# Patient Record
Sex: FEMALE | Race: WHITE | NOT HISPANIC OR LATINO | Employment: UNEMPLOYED | URBAN - METROPOLITAN AREA
[De-identification: names, ages, dates, MRNs, and addresses within clinical notes are randomized per-mention and may not be internally consistent; named-entity substitution may affect disease eponyms.]

---

## 2017-01-26 DIAGNOSIS — G40.909 SEIZURE DISORDER (H): Primary | ICD-10-CM

## 2017-01-26 RX ORDER — CARBAMAZEPINE 300 MG/1
CAPSULE, EXTENDED RELEASE ORAL
Qty: 90 CAPSULE | Refills: 0 | Status: SHIPPED | OUTPATIENT
Start: 2017-01-26 | End: 2017-02-09

## 2017-02-09 ENCOUNTER — OFFICE VISIT (OUTPATIENT)
Dept: NEUROLOGY | Facility: CLINIC | Age: 22
End: 2017-02-09

## 2017-02-09 VITALS
SYSTOLIC BLOOD PRESSURE: 130 MMHG | WEIGHT: 257.6 LBS | DIASTOLIC BLOOD PRESSURE: 74 MMHG | HEART RATE: 72 BPM | BODY MASS INDEX: 39.18 KG/M2

## 2017-02-09 DIAGNOSIS — G40.909 SEIZURE DISORDER (H): Primary | ICD-10-CM

## 2017-02-09 DIAGNOSIS — G25.81 RESTLESS LEG SYNDROME: ICD-10-CM

## 2017-02-09 RX ORDER — CARBAMAZEPINE 300 MG/1
CAPSULE, EXTENDED RELEASE ORAL
Qty: 90 CAPSULE | Refills: 11 | Status: SHIPPED | OUTPATIENT
Start: 2017-02-09 | End: 2018-02-14

## 2017-02-09 RX ORDER — ROPINIROLE 0.5 MG/1
0.5 TABLET, FILM COATED ORAL AT BEDTIME
Qty: 30 TABLET | Refills: 11 | Status: SHIPPED | OUTPATIENT
Start: 2017-02-09 | End: 2018-03-11

## 2017-02-09 RX ORDER — FOLIC ACID 1 MG/1
2 TABLET ORAL DAILY
Qty: 60 TABLET | Refills: 11 | Status: SHIPPED | OUTPATIENT
Start: 2017-02-09 | End: 2018-02-19

## 2017-02-09 NOTE — LETTER
Date:February 28, 2017      Patient was self referred, no letter generated. Do not send.        [facility] Health Information

## 2017-02-09 NOTE — MR AVS SNAPSHOT
After Visit Summary   2017    Tita Prieto    MRN: [id number]           Patient Information     Date Of Birth          1995        Visit Information        Provider Department      2017 10:30 AM Maris [facility]        Today's Diagnoses     Seizure disorder (H)    -  1     Restless leg syndrome            Follow-ups after your visit        Follow-up notes from your care team     Return in about 1 year (around 2018).      Who to contact     Please call your clinic at [phone] to:    Ask questions about your health    Make or cancel appointments    Discuss your medicines    Learn about your test results    Speak to your doctor   If you have compliments or concerns about an experience at your clinic, or if you wish to file a complaint, please contact [facility] Patient Relations at [phone] or email us at [email or web address]         Additional Information About Your Visit        MyChart Information     [employer] is an electronic gateway that provides easy, online access to your medical records. With [employer], you can request a clinic appointment, read your test results, renew a prescription or communicate with your care team.     To sign up for [employer] visit the website at [email or web address]   You will be asked to enter the access code listed below, as well as some personal information. Please follow the directions to create your username and password.     Your access code is: [email or web address]  Expires: 5/10/2017  3:29 PM     Your access code will  in 90 days. If you need help or a new code, please contact your [facility] or call [phone] for assistance.        Care EveryWhere ID     This is your Care EveryWhere ID. This could be used by other organizations to access your [facility] medical records  [id number]        Your Vitals Were     Pulse Breastfeeding?                72 No            Blood Pressure from Last 3 Encounters:   02/09/17 130/74   01/04/16 140/80   11/18/15 119/69    Weight from Last 3 Encounters:   02/09/17 257 lb 9.6 oz (116.847 kg)   01/04/16 239 lb 6.4 oz (108.591 kg)   11/17/15 235 lb 6.4 oz (106.777 kg)              We Performed the Following     Carbamazepine and epoxide free and total          Today's Medication Changes          These changes are accurate as of: 2/9/17  3:29 PM.  If you have any questions, ask your nurse or doctor.               Start taking these medicines.        Dose/Directions    folic acid 1 MG tablet   Commonly known as:  FOLVITE   Used for:  Seizure disorder (H)   Started by:  Maris Otero MD        Dose:  2 mg   Take 2 tablets (2 mg) by mouth daily   Quantity:  60 tablet   Refills:  11         These medicines have changed or have updated prescriptions.        Dose/Directions    carBAMazepine 300 MG 12 hr capsule   Commonly known as:  CARBATROL   This may have changed:  See the new instructions.   Used for:  Seizure disorder (H)   Changed by:  Maris Otero MD        TAKE ONE CAPSULE BY MOUTH DAILY IN THE MORNING AND TWO CAPSULES DAILY IN THE EVENING   Quantity:  90 capsule   Refills:  11       rOPINIRole 0.5 MG tablet   Commonly known as:  REQUIP   This may have changed:    - medication strength  - how much to take   Used for:  Restless leg syndrome   Changed by:  Maris Otero MD        Dose:  0.5 mg   Take 1 tablet (0.5 mg) by mouth At Bedtime   Quantity:  30 tablet   Refills:  11            Where to get your medicines      These medications were sent to [facility] - [place], [place] - [address]   [address] [facility] [address]     Phone:  [phone]    - carBAMazepine 300 MG 12 hr capsule  - folic acid 1 MG tablet  - rOPINIRole 0.5 MG tablet             Primary Care Provider    None Specified       No primary provider on file.        Thank you!     Thank you for choosing [facility]  for your care. Our goal is  always to provide you with excellent care. Hearing back from our patients is one way we can continue to improve our services. Please take a few minutes to complete the written survey that you may receive in the mail after your visit with us. Thank you!             Your Updated Medication List - Protect others around you: Learn how to safely use, store and throw away your medicines at www.disposemymeds.org.          This list is accurate as of: 2/9/17  3:29 PM.  Always use your most recent med list.                   Brand Name Dispense Instructions for use    carBAMazepine 300 MG 12 hr capsule    CARBATROL    90 capsule    TAKE ONE CAPSULE BY MOUTH DAILY IN THE MORNING AND TWO CAPSULES DAILY IN THE EVENING       folic acid 1 MG tablet    FOLVITE    60 tablet    Take 2 tablets (2 mg) by mouth daily       rOPINIRole 0.5 MG tablet    REQUIP    30 tablet    Take 1 tablet (0.5 mg) by mouth At Bedtime

## 2017-02-09 NOTE — LETTER
"2017       RE: Tita Prieto  : 1995   MRN: [id number]      Dear Colleague,    Thank you for referring your patient, Tita Prieto, to the [facility] at [facility]. Please see a copy of my visit note below.    CC: Follow up for seizures.  HPI: Ms. Tita Evans came back to Epilepsy Clinic today for follow up. She is by herself in the clinic today. She is a 21-year-old, right-handed female with a history of seizures since .  She had seen Dr. Villa from [facility] in the past. Since the last visit,  she had no seizures.  Her last seizure was in .2015, she had two seizures in Oct 2015 on the same day.  They were right leg jerking movement that lasted for 1-2 min each, no loss of consciousness or amnesia.  She denied missing any medications.  She was sleep deprived and overworked on that days.  She is currently taking Carbamazepine 300 mg-600 mg.  No side effect reported.  Last carbamazepine level was 11.5.  She is compliant with her medications.   She was also diagnosed with \"restless leg syndrome\".  She is currently on Requip 05 mg qhs, and it is working very well to control her symptoms.  She also mentioned that exercise helped a lot for her RLS.  If she does not exercise, she will have a lot of problems with RLS.   Her seizures started in . Her typical seizures are described as she has an aura of right arm and leg tingling achy sensation, then followed by right arm and leg shaking arrhythmically which lasts for about 30 seconds to 1 minute. She was started on carbamazepine many years ago, and her seizures were well controlled until 2009 when she was trying to wean herself off the carbamazepine, then she started seizure recurrence. She had a series of a generalized tonic-clonic seizures at that time, so she was put back on carbamazepine. Her seizures usually occur in the early morning. In 2011, when she was seeing Dr. Villa, he " was trying to wean the patient off the carbamazepine again, and in February, after she was off the medication for a week, then she had another simple partial seizure with the right arm and the leg jerking for about 1 minute,   RISK FACTORS FOR SEIZURES: She had no history of head trauma with loss of consciousness. No history of CNS infections. No history of febrile convulsions. TRIGGERS FOR SEIZURES Unknown.   PAST MEDICAL HISTORY: None.   PAST SURGICAL HISTORY: None.   FAMILY HISTORY: No family history of seizures. Grandmother had a history of diabetes. Aunt had a history of brain hemorrhage. Mother and sister have RLS.    Current Outpatient Prescriptions   Medication Sig Dispense Refill     carBAMazepine (CARBATROL) 300 MG 12 hr capsule TAKE ONE CAPSULE BY MOUTH DAILY IN THE MORNING AND TWO CAPSULES DAILY IN THE EVENING 90 capsule 0     ROPINIRole HCl (REQUIP PO) Take by mouth At Bedtime         ALLERGIES: No known drug allergies.   SOCIAL HISTORY: She is single, living with her mother. No smoking, no alcohol, no drug abuse.  She is in college, major in social work.  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  REVIEW OF SYSTEMS: The 10-point review of systems are completely negative except for seizures.   PREVIOUS DIAGNOSTIC TESTING: She had an MRI and EEG studies over 10 years ago. Results are not available at this time.   PHYSICAL EXAMINATION:  Blood pressure 130/74, pulse 72, weight 257 lb 9.6 oz (116.847 kg), not currently breastfeeding.    General exam: General Appearance: No acute distress. HEENT: Normocephalic, atraumatic. Neck: Supple. Extremities: No edema, no clubbing, no cyanosis.     Neurologic Exam: Alert and oriented x3. Speech fluent, appropriate. Normal attention. Cranial Nerves: Pupils are equal, round, reactive to light and accomodation. Extraocular movement intact. No facial weakness or asymmetry. Hearing normal. Motor Exam: Normal. Coordination:no ataxia. Gait and  Station: normal.    IMPRESSION:   1. Epilepsy.  She is a 21-year-old, right-handed female with a history of seizures since 2000.  She had seen Dr. Villa from [facility] in the past. Since the last visit,  she had no seizures.  Her last seizure was in Oc.t 2015, she had two seizures in Oct 2015 on the same day.  They were right leg jerking movement that lasted for 1-2 min each, no loss of consciousness or amnesia.  She denied missing any medications.  She was sleep deprived and overworked on that days.  She is currently taking Carbamazepine 300 mg-600 mg.  No side effect reported.  Last carbamazepine level was 11.5.  She is compliant with her medications.   Patient was consulted regarding pregnancy and the possible fetal malformation with carbamazepine.  She voice the understanding the the increased risk of fetal malformation with AEDs.  She is planning to get pregnant in about one year.  2. Restless leg syndrome. Improved. Continue on Requip.  She is currently on Requip 05 mg qhs, and it is working very well to control her symptoms.  She also mentioned that exercise helped a lot for her RLS.  If she does not exercise, she will have a lot of problems with RLS.    PLAN:   1. Continue carbamazepine 300 mg in a.m. 600 mg p.m.    2. Check Carbamazepine level.  3. Continue Requip 0.5 mg qhs.  4. Folic acid 2 mg qd.  5. RTC in 12 months.    25 min was spent on the visit.  Over 50% of the time was spent on education, counseling about optimal seizure control and coordination of care.      Again, thank you for allowing me to participate in the care of your patient.      Sincerely,    Maris Otero MD

## 2017-02-09 NOTE — PROGRESS NOTES
"CC: Follow up for seizures.  HPI: Ms. Tita Evans came back to Epilepsy Clinic today for follow up. She is by herself in the clinic today. She is a 21-year-old, right-handed female with a history of seizures since 2000.  She had seen Dr. Villa from [facility] in the past. Since the last visit,  she had no seizures.  Her last seizure was in Oc.t 2015, she had two seizures in Oct 2015 on the same day.  They were right leg jerking movement that lasted for 1-2 min each, no loss of consciousness or amnesia.  She denied missing any medications.  She was sleep deprived and overworked on that days.  She is currently taking Carbamazepine 300 mg-600 mg.  No side effect reported.  Last carbamazepine level was 11.5.  She is compliant with her medications.   She was also diagnosed with \"restless leg syndrome\".  She is currently on Requip 05 mg qhs, and it is working very well to control her symptoms.  She also mentioned that exercise helped a lot for her RLS.  If she does not exercise, she will have a lot of problems with RLS.   Her seizures started in 2000. Her typical seizures are described as she has an aura of right arm and leg tingling achy sensation, then followed by right arm and leg shaking arrhythmically which lasts for about 30 seconds to 1 minute. She was started on carbamazepine many years ago, and her seizures were well controlled until 05/2009 when she was trying to wean herself off the carbamazepine, then she started seizure recurrence. She had a series of a generalized tonic-clonic seizures at that time, so she was put back on carbamazepine. Her seizures usually occur in the early morning. In 12/2011, when she was seeing Dr. Villa, he was trying to wean the patient off the carbamazepine again, and in February, after she was off the medication for a week, then she had another simple partial seizure with the right arm and the leg jerking for about 1 minute,   RISK FACTORS FOR SEIZURES: She had no " history of head trauma with loss of consciousness. No history of CNS infections. No history of febrile convulsions. TRIGGERS FOR SEIZURES Unknown.   PAST MEDICAL HISTORY: None.   PAST SURGICAL HISTORY: None.   FAMILY HISTORY: No family history of seizures. Grandmother had a history of diabetes. Aunt had a history of brain hemorrhage. Mother and sister have RLS.    Current Outpatient Prescriptions   Medication Sig Dispense Refill     carBAMazepine (CARBATROL) 300 MG 12 hr capsule TAKE ONE CAPSULE BY MOUTH DAILY IN THE MORNING AND TWO CAPSULES DAILY IN THE EVENING 90 capsule 0     ROPINIRole HCl (REQUIP PO) Take by mouth At Bedtime         ALLERGIES: No known drug allergies.   SOCIAL HISTORY: She is single, living with her mother. No smoking, no alcohol, no drug abuse.  She is in college, major in social work.  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  REVIEW OF SYSTEMS: The 10-point review of systems are completely negative except for seizures.   PREVIOUS DIAGNOSTIC TESTING: She had an MRI and EEG studies over 10 years ago. Results are not available at this time.   PHYSICAL EXAMINATION:  Blood pressure 130/74, pulse 72, weight 257 lb 9.6 oz (116.847 kg), not currently breastfeeding.    General exam: General Appearance: No acute distress. HEENT: Normocephalic, atraumatic. Neck: Supple. Extremities: No edema, no clubbing, no cyanosis.     Neurologic Exam: Alert and oriented x3. Speech fluent, appropriate. Normal attention. Cranial Nerves: Pupils are equal, round, reactive to light and accomodation. Extraocular movement intact. No facial weakness or asymmetry. Hearing normal. Motor Exam: Normal. Coordination:no ataxia. Gait and Station: normal.    IMPRESSION:   1. Epilepsy.  She is a 21-year-old, right-handed female with a history of seizures since 2000.  She had seen Dr. Villa from [facility] in the past. Since the last visit,  she had no seizures.  Her last seizure was in Oc.t  2015, she had two seizures in Oct 2015 on the same day.  They were right leg jerking movement that lasted for 1-2 min each, no loss of consciousness or amnesia.  She denied missing any medications.  She was sleep deprived and overworked on that days.  She is currently taking Carbamazepine 300 mg-600 mg.  No side effect reported.  Last carbamazepine level was 11.5.  She is compliant with her medications.   Patient was consulted regarding pregnancy and the possible fetal malformation with carbamazepine.  She voice the understanding the the increased risk of fetal malformation with AEDs.  She is planning to get pregnant in about one year.  2. Restless leg syndrome. Improved. Continue on Requip.  She is currently on Requip 05 mg qhs, and it is working very well to control her symptoms.  She also mentioned that exercise helped a lot for her RLS.  If she does not exercise, she will have a lot of problems with RLS.    PLAN:   1. Continue carbamazepine 300 mg in a.m. 600 mg p.m.    2. Check Carbamazepine level.  3. Continue Requip 0.5 mg qhs.  4. Folic acid 2 mg qd.  5. RTC in 12 months.    25 min was spent on the visit.  Over 50% of the time was spent on education, counseling about optimal seizure control and coordination of care.

## 2017-02-11 LAB
CARBAMAZEPINE EP FREE SERPL-MCNC: 0.6 UG/ML
CARBAMAZEPINE EP SERPL-MCNC: 1.4 UG/ML
CARBAMAZEPINE FREE SERPL-MCNC: 1.9 UG/ML
CARBAMAZEPINE SERPL-MCNC: 8.4 UG/ML

## 2017-02-22 ENCOUNTER — TELEPHONE (OUTPATIENT)
Dept: NEUROLOGY | Facility: CLINIC | Age: 22
End: 2017-02-22

## 2018-01-06 ENCOUNTER — HOSPITAL ENCOUNTER (EMERGENCY)
Facility: CLINIC | Age: 23
Discharge: HOME | End: 2018-01-06
Attending: EMERGENCY MEDICINE | Admitting: EMERGENCY MEDICINE
Payer: COMMERCIAL

## 2018-01-06 VITALS — OXYGEN SATURATION: 96 % | SYSTOLIC BLOOD PRESSURE: 146 MMHG | TEMPERATURE: 98.5 F | DIASTOLIC BLOOD PRESSURE: 93 MMHG

## 2018-01-06 DIAGNOSIS — G40.109 LOCALIZATION-RELATED FOCAL EPILEPSY WITH SIMPLE PARTIAL SEIZURES (H): ICD-10-CM

## 2018-01-06 DIAGNOSIS — G25.81 RESTLESS LEG SYNDROME: ICD-10-CM

## 2018-01-06 LAB
ANION GAP SERPL CALCULATED.3IONS-SCNC: 11 MMOL/L (ref 3–14)
BASOPHILS # BLD AUTO: 0.1 10E9/L (ref 0–0.2)
BASOPHILS NFR BLD AUTO: 0.4 %
BUN SERPL-MCNC: 15 MG/DL (ref 7–30)
CALCIUM SERPL-MCNC: 8.4 MG/DL (ref 8.5–10.1)
CHLORIDE SERPL-SCNC: 102 MMOL/L (ref 94–109)
CO2 SERPL-SCNC: 24 MMOL/L (ref 20–32)
CREAT SERPL-MCNC: 0.45 MG/DL (ref 0.52–1.04)
DIFFERENTIAL METHOD BLD: ABNORMAL
EOSINOPHIL # BLD AUTO: 0.1 10E9/L (ref 0–0.7)
EOSINOPHIL NFR BLD AUTO: 0.7 %
ERYTHROCYTE [DISTWIDTH] IN BLOOD BY AUTOMATED COUNT: 12.6 % (ref 10–15)
GFR SERPL CREATININE-BSD FRML MDRD: >90 ML/MIN/1.7M2
GLUCOSE SERPL-MCNC: 126 MG/DL (ref 70–99)
HCT VFR BLD AUTO: 39.4 % (ref 35–47)
HGB BLD-MCNC: 13.1 G/DL (ref 11.7–15.7)
IMM GRANULOCYTES # BLD: 0 10E9/L (ref 0–0.4)
IMM GRANULOCYTES NFR BLD: 0.1 %
LYMPHOCYTES # BLD AUTO: 3.7 10E9/L (ref 0.8–5.3)
LYMPHOCYTES NFR BLD AUTO: 30.5 %
MCH RBC QN AUTO: 28.9 PG (ref 26.5–33)
MCHC RBC AUTO-ENTMCNC: 33.2 G/DL (ref 31.5–36.5)
MCV RBC AUTO: 87 FL (ref 78–100)
MONOCYTES # BLD AUTO: 0.6 10E9/L (ref 0–1.3)
MONOCYTES NFR BLD AUTO: 5 %
NEUTROPHILS # BLD AUTO: 7.6 10E9/L (ref 1.6–8.3)
NEUTROPHILS NFR BLD AUTO: 63.3 %
PLATELET # BLD AUTO: 357 10E9/L (ref 150–450)
POTASSIUM SERPL-SCNC: 4 MMOL/L (ref 3.4–5.3)
RBC # BLD AUTO: 4.53 10E12/L (ref 3.8–5.2)
SODIUM SERPL-SCNC: 137 MMOL/L (ref 133–144)
WBC # BLD AUTO: 12.1 10E9/L (ref 4–11)

## 2018-01-06 PROCEDURE — 80156 ASSAY CARBAMAZEPINE TOTAL: CPT | Performed by: EMERGENCY MEDICINE

## 2018-01-06 PROCEDURE — 80339 ANTIEPILEPTICS NOS 1-3: CPT | Performed by: EMERGENCY MEDICINE

## 2018-01-06 PROCEDURE — 80048 BASIC METABOLIC PNL TOTAL CA: CPT | Performed by: EMERGENCY MEDICINE

## 2018-01-06 PROCEDURE — 80157 ASSAY CARBAMAZEPINE FREE: CPT | Performed by: EMERGENCY MEDICINE

## 2018-01-06 PROCEDURE — 99285 EMERGENCY DEPT VISIT HI MDM: CPT | Mod: Z6 | Performed by: EMERGENCY MEDICINE

## 2018-01-06 PROCEDURE — 85025 COMPLETE CBC W/AUTO DIFF WBC: CPT | Performed by: EMERGENCY MEDICINE

## 2018-01-06 PROCEDURE — 25000128 H RX IP 250 OP 636: Performed by: EMERGENCY MEDICINE

## 2018-01-06 PROCEDURE — 96374 THER/PROPH/DIAG INJ IV PUSH: CPT | Performed by: EMERGENCY MEDICINE

## 2018-01-06 PROCEDURE — 99285 EMERGENCY DEPT VISIT HI MDM: CPT | Mod: 25 | Performed by: EMERGENCY MEDICINE

## 2018-01-06 RX ORDER — LORAZEPAM 2 MG/ML
1 INJECTION INTRAMUSCULAR ONCE
Status: COMPLETED | OUTPATIENT
Start: 2018-01-06 | End: 2018-01-06

## 2018-01-06 RX ORDER — LORAZEPAM 1 MG/1
1 TABLET ORAL EVERY 8 HOURS PRN
Qty: 15 TABLET | Refills: 0 | Status: SHIPPED | OUTPATIENT
Start: 2018-01-06 | End: 2018-02-19

## 2018-01-06 RX ADMIN — LORAZEPAM 1 MG: 2 INJECTION INTRAMUSCULAR; INTRAVENOUS at 21:27

## 2018-01-06 ASSESSMENT — ENCOUNTER SYMPTOMS
DIARRHEA: 0
HEMATURIA: 0
DYSURIA: 0
DIFFICULTY URINATING: 0
RHINORRHEA: 1
COUGH: 1
CONSTIPATION: 0
FEVER: 0
SEIZURES: 1
FREQUENCY: 0
BLOOD IN STOOL: 0
HEADACHES: 0

## 2018-01-06 NOTE — ED AVS SNAPSHOT
[facility]    [address] [address]    Phone:  [phone]    Fax:  [phone]                                       Tita Prieto   MRN: [id number]    Department:  [facility]   Date of Visit:  1/6/2018           After Visit Summary Signature Page     I have received my discharge instructions, and my questions have been answered. I have discussed any challenges I see with this plan with the nurse or doctor.    ..........................................................................................................................................  Patient/Patient Representative Signature      ..........................................................................................................................................  Patient Representative Print Name and Relationship to Patient    ..................................................               ................................................  Date                                            Time    ..........................................................................................................................................  Reviewed by Signature/Title    ...................................................              ..............................................  Date                                                            Time

## 2018-01-06 NOTE — ED AVS SNAPSHOT
[facility] Emergency Department    [address] [address]    Phone:  [phone]    Fax:  [phone]                                       Tita Prieto   MRN: [id number]    Department:  [facility] Emergency Department   Date of Visit:  1/6/2018           Patient Information     Date Of Birth          1995        Your diagnoses for this visit were:     Localization-related focal epilepsy with simple partial seizures (H)     Restless leg syndrome        You were seen by Oracio Castillo MD.        Discharge Instructions       Follow up with neurology.   Ativan only if needed for further seizures and should contact neurology if this occurs.          Discharge Instructions for Epilepsy  You have been diagnosed with epilepsy, a disorder of recurring seizures. When you have a seizure, an electrical disturbance happens in your brain. There are different kinds of seizures, and each patient may have one or many types of seizures. Here are some guidelines for you and your family.  If you have a seizure  Ask friends and family members to learn seizure management. Also, tell them to do the following if you have a seizure:    Clear the area to prevent injury.    Position you on a flat, carpeted surface, if possible.    Don t try to restrain you.    Don t put anything in your mouth.    Turn you onto your side if you start to vomit.    Keep track of the date and time the seizure started, how long it lasted, whether or not you lost consciousness, a description of your body movements, what provoked the seizure (if known), and any injuries you suffered. Using a watch may help keep correct time of events.      Stay with you until you regain consciousness.    Call 911 if the seizure is longer than 5 minutes, if there are multiple seizures, or if you do not begin to wake up after the seizure stops.    Activities  Following are some things to consider:    Enjoy your normal activities. Most  people with epilepsy lead normal lives.    Avoid hazardous activities, such as mountain climbing or scuba diving. A seizure under these conditions could lead to a fatal accident.    Do not swim alone or participate in other similar activities without others nearby.    Ask your healthcare provider about any restrictions on driving or other activities.    Check with your [facility] department of public safety to learn whether there are any driving limitations based on your condition.  Other home care  Other considerations:    Take your medicine exactly as directed. Skipping doses can affect the way your body handles the medicine, which could cause you to have a seizure.    Don t drink alcohol or use any medicine without talking with your healthcare provider first.    Seizure medicines may interact with other medicines. Make sure all of your healthcare providers have a list of all your medicines.     Birth control pills may not work as effectively when taking seizure medicines. Ask your healthcare provider if a change in birth control is needed.     Wear a medical alert pendant or bracelet that alerts others to your condition, especially if you are allergic to seizure medicine.    Join a local support group. Ask your healthcare provider for names and phone numbers.  .  When to seek medical attention  Tell your family members or friends to call 911 right away if you have:    Seizure that lasts more than 5 minutes    Multiple seizures in a row    No recovery of consciousness after the seizure stops  Otherwise, have them call your healthcare provider right away if you have:    Seizures that are getting longer and worse    Seizures that are different from those you ve had in the past    Seizures strong enough to cause injury    Skin rash    Fever of 100.4 F (38 C) or higher, or as directed by your healthcare provider   Date Last Reviewed: 11/5/2015 2000-2017 [employer]. [address] [place]  [address]. All rights reserved. This information is not intended as a substitute for professional medical care. Always follow your healthcare professional's instructions.          Future Appointments        Provider Department Dept Phone Center    1/11/2018 10:30 AM Maris Otero MD [facility] [phone] [facility] Owned      24 Hour Appointment Hotline       To make an appointment at any [facility], call [phone] ([phone]). If you don't have a family doctor or clinic, we will help you find one. [facility] are conveniently located to serve the needs of you and your family.             Review of your medicines      START taking        Dose / Directions Last dose taken    LORazepam 1 MG tablet   Commonly known as:  ATIVAN   Dose:  1 mg   Quantity:  15 tablet        Take 1 tablet (1 mg) by mouth every 8 hours as needed for anxiety or seizures   Refills:  0          Our records show that you are taking the medicines listed below. If these are incorrect, please call your family doctor or clinic.        Dose / Directions Last dose taken    carBAMazepine 300 MG 12 hr capsule   Commonly known as:  CARBATROL   Quantity:  90 capsule        TAKE ONE CAPSULE BY MOUTH DAILY IN THE MORNING AND TWO CAPSULES DAILY IN THE EVENING   Refills:  11        folic acid 1 MG tablet   Commonly known as:  FOLVITE   Dose:  2 mg   Quantity:  60 tablet        Take 2 tablets (2 mg) by mouth daily   Refills:  11        rOPINIRole 0.5 MG tablet   Commonly known as:  REQUIP   Dose:  0.5 mg   Quantity:  30 tablet        Take 1 tablet (0.5 mg) by mouth At Bedtime   Refills:  11                Prescriptions were sent or printed at these locations (1 Prescription)                   Other Prescriptions                Printed at Department/Unit printer (1 of 1)         LORazepam (ATIVAN) 1 MG tablet                Procedures and tests performed during your visit     Basic metabolic panel    CBC with platelets differential     Carbamazepine and epoxide free and total    Peripheral IV catheter      Orders Needing Specimen Collection     None      Pending Results     Date and Time Order Name Status Description    1/6/2018 2104 Carbamazepine and epoxide free and total In process             Pending Culture Results     No orders found from 1/4/2018 to 1/7/2018.            Pending Results Instructions     If you had any lab results that were not finalized at the time of your Discharge, you can call the ED Lab Result RN at [phone]. You will be contacted by this team for any positive Lab results or changes in treatment. The nurses are available 7 days a week from 10A to 6:30P.  You can leave a message 24 hours per day and they will return your call.        Test Results From Your Hospital Stay        1/6/2018  9:19 PM      Component Results     Component Value Ref Range & Units Status    WBC 12.1 (H) 4.0 - 11.0 10e9/L Final    RBC Count 4.53 3.8 - 5.2 10e12/L Final    Hemoglobin 13.1 11.7 - 15.7 g/dL Final    Hematocrit 39.4 35.0 - 47.0 % Final    MCV 87 78 - 100 fl Final    MCH 28.9 26.5 - 33.0 pg Final    MCHC 33.2 31.5 - 36.5 g/dL Final    RDW 12.6 10.0 - 15.0 % Final    Platelet Count 357 150 - 450 10e9/L Final    Diff Method Automated Method  Final    % Neutrophils 63.3 % Final    % Lymphocytes 30.5 % Final    % Monocytes 5.0 % Final    % Eosinophils 0.7 % Final    % Basophils 0.4 % Final    % Immature Granulocytes 0.1 % Final    Absolute Neutrophil 7.6 1.6 - 8.3 10e9/L Final    Absolute Lymphocytes 3.7 0.8 - 5.3 10e9/L Final    Absolute Monocytes 0.6 0.0 - 1.3 10e9/L Final    Absolute Eosinophils 0.1 0.0 - 0.7 10e9/L Final    Absolute Basophils 0.1 0.0 - 0.2 10e9/L Final    Abs Immature Granulocytes 0.0 0 - 0.4 10e9/L Final         1/6/2018  9:38 PM      Component Results     Component Value Ref Range & Units Status    Sodium 137 133 - 144 mmol/L Final    Potassium 4.0 3.4 - 5.3 mmol/L Final    Specimen slightly hemolyzed, potassium  "may be falsely elevated    Chloride 102 94 - 109 mmol/L Final    Carbon Dioxide 24 20 - 32 mmol/L Final    Anion Gap 11 3 - 14 mmol/L Final    Glucose 126 (H) 70 - 99 mg/dL Final    Urea Nitrogen 15 7 - 30 mg/dL Final    Creatinine 0.45 (L) 0.52 - 1.04 mg/dL Final    GFR Estimate >90 >60 mL/min/1.7m2 Final    Non  GFR Calc    GFR Estimate If Black >90 >60 mL/min/1.7m2 Final    African American GFR Calc    Calcium 8.4 (L) 8.5 - 10.1 mg/dL Final         2018  9:11 PM                Thank you for choosing [facility]       Thank you for choosing [facility] for your care. Our goal is always to provide you with excellent care. Hearing back from our patients is one way we can continue to improve our services. Please take a few minutes to complete the written survey that you may receive in the mail after you visit with us. Thank you!        [employer] Information     [employer] lets you send messages to your doctor, view your test results, renew your prescriptions, schedule appointments and more. To sign up, go to [email or web address] . Click on \"Log in\" on the left side of the screen, which will take you to the Welcome page. Then click on \"Sign up Now\" on the right side of the page.     You will be asked to enter the access code listed below, as well as some personal information. Please follow the directions to create your username and password.     Your access code is: [email or web address]  Expires: 2018 10:21 PM     Your access code will  in 90 days. If you need help or a new code, please call your [facility] clinic or [phone].        Care EveryWhere ID     This is your Care EveryWhere ID. This could be used by other organizations to access your [facility] medical records  [id number]        Equal Access to Services     EARL BROWN : Abhay Fernandez, domi olvera, rosa florian. [facility] [phone].    ATENCIÓN: Si gutierrezla español, " tiene a manzano disposición servicios gratuitos de asistencia lingüística. Llmarkus al [phone].    We comply with applicable federal civil rights laws and [place] laws. We do not discriminate on the basis of race, color, national origin, age, disability, sex, sexual orientation, or gender identity.            After Visit Summary       This is your record. Keep this with you and show to your community pharmacist(s) and doctor(s) at your next visit.

## 2018-01-07 NOTE — ED PROVIDER NOTES
"  History     Chief Complaint   Patient presents with     Seizures     known hx focal seizures- 2 today, meds not helping. Last seizure approx 1 year ago     HPI  Tita Prieto is a 22 year old female with a history of focal seizures and restless leg syndrome who presents to the ED with a friend for the evaluation of seizures. Patient has had seizures since 2000 and is on carbamazepine and ropinirole for RLS. She is followed by Dr. Maris Otero in Neurology. Patient has had two seizures today, the first one being 1 hour PTA and the second one being 15 minutes PTA. She typically has an aura characterized by heaviness of limbs and feeling \"fuzzy\" beforehand and experienced the same thing tonight. Patient reports her seizures tonight were similar to those she has had in the past, and included shaking and twitching of the right lower extremity and numbness in the right upper extremity. She denies any shaking in her upper extremities. Patient admits she has lost consciousness in previous seizures, but did not lose consciousness today. Her last seizure was at least 1 year ago and her seizures usually occur once a year. Patient reports she has been adherent to her medication. Her last appointment with neurology was on 2/22/17. She reports a 2 day history of cold symptoms as well. At time of assessment, patient states her lower extremities feel \"muted\". Denies headache, fever, urinary symptoms, bowel symptoms, drug use, pain.    Problem List:    Patient Active Problem List    Diagnosis Date Noted     Hand weakness 11/16/2015     Priority: Medium        Past Medical History:    No past medical history on file.    Past Surgical History:    Past Surgical History:   Procedure Laterality Date     SURGICAL HISTORY OF -   06/03/1994    PE tubes       Family History:    No family history on file.    Social History:  Marital Status:   [2]  Social History   Substance Use Topics     Smoking status: Never Smoker     Smokeless " tobacco: Never Used     Alcohol use No        Medications:      LORazepam (ATIVAN) 1 MG tablet   carBAMazepine (CARBATROL) 300 MG 12 hr capsule   rOPINIRole (REQUIP) 0.5 MG tablet   folic acid (FOLVITE) 1 MG tablet         Review of Systems   Constitutional: Negative for fever.   HENT: Positive for rhinorrhea.    Respiratory: Positive for cough.    Gastrointestinal: Negative for blood in stool, constipation and diarrhea.   Genitourinary: Negative for difficulty urinating, dysuria, frequency, hematuria and urgency.   Neurological: Positive for seizures (tremors in right leg, numbness in right arm). Negative for headaches.   All other systems reviewed and are negative.      Physical Exam   BP: (!) 162/104  Heart Rate: 101  Temp: 98.5  F (36.9  C)  SpO2: 100 %      Physical Exam  /83  Temp 98.5  F (36.9  C) (Oral)  SpO2 98%  General: alert, interactive, in no apparent distress.  Anxious appearing.    Head: atraumatic  Nose: no rhinorrhea or epistaxis  Ears: no external auditory canal discharge or bleeding.    Eyes: Sclera nonicteric. Conjunctiva noninjected. PERRL, EOMI  Mouth: no tonsillar erythema, edema, or exudate  Neck: supple, no palp LAD  Lungs: CTAB  CV: RRR, S1/S2; peripheral pulses palpable and symmetric  Abdomen: soft, nt, nd, no guarding or rebound. Positive bowel sounds  Extremities: no cyanosis or edema  Skin: no rash or diaphoresis  Neuro: CN II-XII grossly intact, strength 5/5 in UE and LEs bilaterally, sensation intact to light touch in UE and LEs bilaterally; finger to nose is normal.      ED Course     ED Course     Procedures               Critical Care time:  none               Labs Ordered and Resulted from Time of ED Arrival Up to the Time of Departure from the ED   CBC WITH PLATELETS DIFFERENTIAL - Abnormal; Notable for the following:        Result Value    WBC 12.1 (*)     All other components within normal limits   BASIC METABOLIC PANEL - Abnormal; Notable for the following:      Glucose 126 (*)     Creatinine 0.45 (*)     Calcium 8.4 (*)     All other components within normal limits   CARBAMAZEPINE AND EPOXIDE FREE AND TOTAL   PERIPHERAL IV CATHETER     Results for orders placed or performed during the hospital encounter of 01/06/18 (from the past 24 hour(s))   CBC with platelets differential   Result Value Ref Range    WBC 12.1 (H) 4.0 - 11.0 10e9/L    RBC Count 4.53 3.8 - 5.2 10e12/L    Hemoglobin 13.1 11.7 - 15.7 g/dL    Hematocrit 39.4 35.0 - 47.0 %    MCV 87 78 - 100 fl    MCH 28.9 26.5 - 33.0 pg    MCHC 33.2 31.5 - 36.5 g/dL    RDW 12.6 10.0 - 15.0 %    Platelet Count 357 150 - 450 10e9/L    Diff Method Automated Method     % Neutrophils 63.3 %    % Lymphocytes 30.5 %    % Monocytes 5.0 %    % Eosinophils 0.7 %    % Basophils 0.4 %    % Immature Granulocytes 0.1 %    Absolute Neutrophil 7.6 1.6 - 8.3 10e9/L    Absolute Lymphocytes 3.7 0.8 - 5.3 10e9/L    Absolute Monocytes 0.6 0.0 - 1.3 10e9/L    Absolute Eosinophils 0.1 0.0 - 0.7 10e9/L    Absolute Basophils 0.1 0.0 - 0.2 10e9/L    Abs Immature Granulocytes 0.0 0 - 0.4 10e9/L   Basic metabolic panel   Result Value Ref Range    Sodium 137 133 - 144 mmol/L    Potassium 4.0 3.4 - 5.3 mmol/L    Chloride 102 94 - 109 mmol/L    Carbon Dioxide 24 20 - 32 mmol/L    Anion Gap 11 3 - 14 mmol/L    Glucose 126 (H) 70 - 99 mg/dL    Urea Nitrogen 15 7 - 30 mg/dL    Creatinine 0.45 (L) 0.52 - 1.04 mg/dL    GFR Estimate >90 >60 mL/min/1.7m2    GFR Estimate If Black >90 >60 mL/min/1.7m2    Calcium 8.4 (L) 8.5 - 10.1 mg/dL       Medications   LORazepam (ATIVAN) injection 1 mg (1 mg Intravenous Given 1/6/18 2127)       9:42 PM Patient assessed.    Assessments & Plan (with Medical Decision Making)  22 year old female, with past medical history significant for epilepsy, restless legs, presenting to the emergency department with concerns regarding seizure activity.  I reviewed patient's previous medical records, with neurology visit by Dr. Otero in  February, 2017.  It was noted that patient's last seizure was in October, 2015.  Patient states that she had 2 episodes of focal seizures today, with extremity heaviness, in addition to twitching of the right lower extremity.  No arm twitching.  No loss of consciousness, nausea, or vomiting.  Has had recent cough, congestion, and viral type symptoms.  She arrives afebrile, anxious appearing, slightly tachycardic, with no shaking episode, however does appear tremulous in general secondary to what appears to be anxiety type symptoms.    IV established, and labs drawn.  Patient then did develop twitching of the right lower extremity.  There was no obvious rhythmic motion to this.  With distraction activities, this did appear to decrease somewhat, however not entirely.    Patient was given 1 mg IV Ativan.  Upon recheck, had resolution of twitching.    Laboratory workup including CBC shows slightly elevated white blood cell count, nonspecific.  She also does have basic metabolic panel which is normal.  Carbamazepine levels were drawn, and will be a send out lab, and are pending.  She reports compliance on her seizure medications.  However, given recent infectious symptoms with viral type symptoms, quite possible that this represents seizure secondary to the recent illness.    Patient encouraged continue compliance with carbamazepine.  Encouraged to follow-up with neurology, and return if severe worsening of symptoms.  She will be given 15 tablets of Ativan to be taken as needed if continued episodes of seizure/shaking.  Discussed she should contact neurology if further seizure episodes.       I have reviewed the nursing notes.    I have reviewed the findings, diagnosis, plan and need for follow up with the patient.       New Prescriptions    LORAZEPAM (ATIVAN) 1 MG TABLET    Take 1 tablet (1 mg) by mouth every 8 hours as needed for anxiety or seizures       Final diagnoses:   Localization-related focal epilepsy with  simple partial seizures (H)   Restless leg syndrome     This document serves as a record of the services and decisions personally performed and made by Oracio Castillo MD. It was created on HIS/HER behalf by   Lenore Trent, a trained medical scribe. The creation of this document is based the provider's statements to the medical scribe.  Lenore Trent 9:42 PM 1/6/2018    Provider:   The information in this document, created by the medical scribe for me, accurately reflects the services I personally performed and the decisions made by me. I have reviewed and approved this document for accuracy prior to leaving the patient care area.  Oracio Castillo MD 9:42 PM 1/6/2018 1/6/2018   [facility] EMERGENCY DEPARTMENT     Oracio Castillo MD  01/06/18 1917

## 2018-01-07 NOTE — DISCHARGE INSTRUCTIONS
Follow up with neurology.   Ativan only if needed for further seizures and should contact neurology if this occurs.          Discharge Instructions for Epilepsy  You have been diagnosed with epilepsy, a disorder of recurring seizures. When you have a seizure, an electrical disturbance happens in your brain. There are different kinds of seizures, and each patient may have one or many types of seizures. Here are some guidelines for you and your family.  If you have a seizure  Ask friends and family members to learn seizure management. Also, tell them to do the following if you have a seizure:    Clear the area to prevent injury.    Position you on a flat, carpeted surface, if possible.    Don t try to restrain you.    Don t put anything in your mouth.    Turn you onto your side if you start to vomit.    Keep track of the date and time the seizure started, how long it lasted, whether or not you lost consciousness, a description of your body movements, what provoked the seizure (if known), and any injuries you suffered. Using a watch may help keep correct time of events.      Stay with you until you regain consciousness.    Call 911 if the seizure is longer than 5 minutes, if there are multiple seizures, or if you do not begin to wake up after the seizure stops.    Activities  Following are some things to consider:    Enjoy your normal activities. Most people with epilepsy lead normal lives.    Avoid hazardous activities, such as mountain climbing or scuba diving. A seizure under these conditions could lead to a fatal accident.    Do not swim alone or participate in other similar activities without others nearby.    Ask your healthcare provider about any restrictions on driving or other activities.    Check with your state department of public safety to learn whether there are any driving limitations based on your condition.  Other home care  Other considerations:    Take your medicine exactly as directed. Skipping  doses can affect the way your body handles the medicine, which could cause you to have a seizure.    Don t drink alcohol or use any medicine without talking with your healthcare provider first.    Seizure medicines may interact with other medicines. Make sure all of your healthcare providers have a list of all your medicines.     Birth control pills may not work as effectively when taking seizure medicines. Ask your healthcare provider if a change in birth control is needed.     Wear a medical alert pendant or bracelet that alerts others to your condition, especially if you are allergic to seizure medicine.    Join a local support group. Ask your healthcare provider for names and phone numbers.  .  When to seek medical attention  Tell your family members or friends to call 911 right away if you have:    Seizure that lasts more than 5 minutes    Multiple seizures in a row    No recovery of consciousness after the seizure stops  Otherwise, have them call your healthcare provider right away if you have:    Seizures that are getting longer and worse    Seizures that are different from those you ve had in the past    Seizures strong enough to cause injury    Skin rash    Fever of 100.4 F (38 C) or higher, or as directed by your healthcare provider   Date Last Reviewed: 11/5/2015 2000-2017 [employer]. [address] [place] [address], [place] [address]. All rights reserved. This information is not intended as a substitute for professional medical care. Always follow your healthcare professional's instructions.

## 2018-01-09 LAB
CARBAMAZEPINE EP FREE SERPL-MCNC: 0.6 UG/ML
CARBAMAZEPINE EP SERPL-MCNC: 1.4 UG/ML
CARBAMAZEPINE FREE SERPL-MCNC: 1.8 UG/ML
CARBAMAZEPINE SERPL-MCNC: 9.7 UG/ML

## 2018-01-20 ENCOUNTER — HEALTH MAINTENANCE LETTER (OUTPATIENT)
Age: 23
End: 2018-01-20

## 2018-02-10 DIAGNOSIS — G40.909 SEIZURE DISORDER (H): ICD-10-CM

## 2018-02-14 ENCOUNTER — TELEPHONE (OUTPATIENT)
Dept: NEUROLOGY | Facility: CLINIC | Age: 23
End: 2018-02-14

## 2018-02-14 DIAGNOSIS — G40.909 SEIZURE DISORDER (H): ICD-10-CM

## 2018-02-14 RX ORDER — CARBAMAZEPINE 300 MG/1
CAPSULE, EXTENDED RELEASE ORAL
Qty: 90 CAPSULE | Refills: 11 | Status: CANCELLED | OUTPATIENT
Start: 2018-02-14

## 2018-02-14 RX ORDER — CARBAMAZEPINE 300 MG/1
CAPSULE, EXTENDED RELEASE ORAL
Qty: 90 CAPSULE | Refills: 0 | Status: SHIPPED | OUTPATIENT
Start: 2018-02-14 | End: 2018-02-19

## 2018-02-19 ENCOUNTER — OFFICE VISIT (OUTPATIENT)
Dept: NEUROLOGY | Facility: CLINIC | Age: 23
End: 2018-02-19
Payer: COMMERCIAL

## 2018-02-19 VITALS
SYSTOLIC BLOOD PRESSURE: 149 MMHG | DIASTOLIC BLOOD PRESSURE: 83 MMHG | RESPIRATION RATE: 16 BRPM | TEMPERATURE: 97.7 F | HEART RATE: 79 BPM | WEIGHT: 248.6 LBS | HEIGHT: 67 IN | BODY MASS INDEX: 39.02 KG/M2

## 2018-02-19 DIAGNOSIS — G40.909 SEIZURE DISORDER (H): ICD-10-CM

## 2018-02-19 RX ORDER — LORAZEPAM 1 MG/1
1 TABLET ORAL EVERY 8 HOURS PRN
Qty: 15 TABLET | Refills: 0 | Status: ON HOLD | OUTPATIENT
Start: 2018-02-19 | End: 2018-12-10

## 2018-02-19 RX ORDER — CARBAMAZEPINE 300 MG/1
CAPSULE, EXTENDED RELEASE ORAL
Qty: 90 CAPSULE | Refills: 11 | Status: SHIPPED | OUTPATIENT
Start: 2018-02-19 | End: 2019-03-04

## 2018-02-19 RX ORDER — FOLIC ACID 1 MG/1
2 TABLET ORAL DAILY
Qty: 60 TABLET | Refills: 11 | Status: SHIPPED | OUTPATIENT
Start: 2018-02-19 | End: 2019-03-14

## 2018-02-19 ASSESSMENT — PAIN SCALES - GENERAL: PAINLEVEL: NO PAIN (0)

## 2018-02-19 NOTE — LETTER
"2018       RE: Tita Prieto  : 1995   MRN: [id number]      Dear Colleague,    Thank you for referring your patient, Tita Prieto, to the [facility] at [facility]. Please see a copy of my visit note below.    CC: Follow up for seizures.  HPI: Ms. Tita Evans came back to Ep[facility]psy Clinic today for follow up. She is by herself in the clinic today. She is a 21-year-old, right-handed female with a history of seizures since .  She had seen Dr. Villa from [facility] in the past. Since the last visit,  she had two seizures within one day on 2018.  The seizures were her usual seizures with right leg jerking movement that lasted for 1-2 min each, no loss of consciousness or amnesia.  She denied missing any medications.  She was sleep deprived and was sick with URI for a few days around that time.  She was sent to ED and her tegretol level was 9.7.  No medication changes were made.  Her last seizure before this was in Oc.t , she had two seizures in Oct 2015 on the same day.  She is currently taking Carbamazepine 300 mg-600 mg.  No side effect reported.  She is compliant with her medications.   She was also diagnosed with \"restless leg syndrome\".  She is currently on Requip 05 mg qhs, and it is working very well to control her symptoms.  She also mentioned that exercise helped a lot for her RLS.  If she does not exercise, she will have a lot of problems with RLS.   Her seizures started in . Her typical seizures are described as she has an aura of right arm and leg tingling achy sensation, then followed by right arm and leg shaking arrhythmically which lasts for about 30 seconds to 1 minute. She was started on carbamazepine many years ago, and her seizures were well controlled until 2009 when she was trying to wean herself off the carbamazepine, then she started seizure recurrence. She had a series of a generalized tonic-clonic " "seizures at that time, so she was put back on carbamazepine. Her seizures usually occur in the early morning. In 12/2011, when she was seeing Dr. Villa, he was trying to wean the patient off the carbamazepine again, and in February, after she was off the medication for a week, then she had another simple partial seizure with the right arm and the leg jerking for about 1 minute,   RISK FACTORS FOR SEIZURES: She had no history of head trauma with loss of consciousness. No history of CNS infections. No history of febrile convulsions. TRIGGERS FOR SEIZURES Unknown.   PAST MEDICAL HISTORY: None.   PAST SURGICAL HISTORY: None.   FAMILY HISTORY: No family history of seizures. Grandmother had a history of diabetes. Aunt had a history of brain hemorrhage. Mother and sister have RLS.    Current Outpatient Prescriptions   Medication Sig Dispense Refill     carBAMazepine (CARBATROL) 300 MG 12 hr capsule TAKE ONE CAPSULE BY MOUTH DAILY IN THE MORNING AND TWO CAPSULES DAILY IN THE EVENING 90 capsule 0     LORazepam (ATIVAN) 1 MG tablet Take 1 tablet (1 mg) by mouth every 8 hours as needed for anxiety or seizures 15 tablet 0     rOPINIRole (REQUIP) 0.5 MG tablet Take 1 tablet (0.5 mg) by mouth At Bedtime 30 tablet 11     folic acid (FOLVITE) 1 MG tablet Take 2 tablets (2 mg) by mouth daily 60 tablet 11       ALLERGIES: No known drug allergies.   SOCIAL HISTORY: She is single, living with her mother. No smoking, no alcohol, no drug abuse.  She is in college, major in social work.  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  REVIEW OF SYSTEMS: The 10-point review of systems are completely negative except for seizures.   PREVIOUS DIAGNOSTIC TESTING: She had an MRI and EEG studies over 10 years ago. Results are not available at this time.   PHYSICAL EXAMINATION:  Blood pressure 149/83, pulse 79, temperature 97.7  F (36.5  C), resp. rate 16, height 5' 7.32\" (171 cm), weight 248 lb 9.6 oz (112.8 kg), not " currently breastfeeding.    General exam: General Appearance: No acute distress. HEENT: Normocephalic, atraumatic. Neck: Supple. Extremities: No edema, no clubbing, no cyanosis.     Neurologic Exam: Alert and oriented x3. Speech fluent, appropriate. Normal attention. Cranial Nerves: Pupils are equal, round, reactive to light and accomodation. Extraocular movement intact. No facial weakness or asymmetry. Hearing normal. Motor Exam: Normal. Coordination:no ataxia. Gait and Station: normal.    Labs:    Component      Latest Ref Rng & Units 1/6/2018   Carbamazepine Total Level      4.0 - 12.0 ug/mL 9.7   10, 11 Epoxide Level      0.4 - 4.0 ug/mL 1.4   Free Carbamazepine Level Ug/Ml      0.6 - 4.2 ug/mL 1.8   Free Epoxide Level      0.2 - 2.0 ug/mL 0.6     IMPRESSION:   1. Epilepsy.  She is a 21-year-old, right-handed female with a history of seizures since 2000.  She had seen Dr. Villa from [facility] in the past. Since the last visit,  she had two seizures within one day on Jan. 8th, 2018.  The seizures were her usual seizures with right leg jerking movement that lasted for 1-2 min each, no loss of consciousness or amnesia.  She denied missing any medications.  She was sleep deprived and was sick with URI for a few days around that time.  She was sent to ED and her tegretol level was 9.7.  No medication changes were made.  Her last seizure before this was in Oc.t 2015, she had two seizures in Oct 2015 on the same day.  She is currently taking Carbamazepine 300 mg-600 mg.  No side effect reported.  She is compliant with her medications.   Patient was consulted regarding pregnancy and the possible fetal malformation with carbamazepine.  She voices understanding the the increased risk of fetal malformation with AEDs.  She is planning to get pregnant in about one year.    2. Restless leg syndrome. Improved. Continue on Requip.  She is currently on Requip 05 mg qhs, and it is working very well to control her  symptoms.  She also mentioned that exercise helped a lot for her RLS.  If she does not exercise, she will have a lot of problems with RLS.    PLAN:   1. Continue carbamazepine 300 mg in a.m. 600 mg p.m.    2. Continue Requip 0.5 mg qhs.  3. Folic acid 2 mg qd.  4. RTC in 12 months.    25 min was spent on the visit.  Over 50% of the time was spent on education, counseling about optimal seizure control and coordination of care.      Sincerely,    Maris Otero MD

## 2018-02-19 NOTE — MR AVS SNAPSHOT
After Visit Summary   2018    Tita Prieto    MRN: [id number]           Patient Information     Date Of Birth          1995        Visit Information        Provider Department      2018 9:30 AM Maris Otero MD [facility]        Today's Diagnoses     Seizure disorder (H)          Care Instructions      Times of Days am  pm        Medication Tablet Size Number of Tablets/Capsules Total Daily Dosage    Tegretol 300 1 2        900 mg                                                                                                                                   Carry this with you at all times.  CONTINUE TAKING YOUR OTHER MEDICATIONS AS PREVIOUSLY DIRECTED.      * * *Do not store medications in the bathroom.  Keep medications away from children!* * *             Follow-ups after your visit        Follow-up notes from your care team     Return in about 1 year (around 2019).      Who to contact     Please call your clinic at [phone] to:    Ask questions about your health    Make or cancel appointments    Discuss your medicines    Learn about your test results    Speak to your doctor            Additional Information About Your Visit        MyChart Information     Zocere is an electronic gateway that provides easy, online access to your medical records. With Zocere, you can request a clinic appointment, read your test results, renew a prescription or communicate with your care team.     To sign up for Zocere visit the website at [email or web address]   You will be asked to enter the access code listed below, as well as some personal information. Please follow the directions to create your username and password.     Your access code is: [email or web address]  Expires: 2018 10:21 PM     Your access code will  in 90 days. If you need help or a new code, please contact your [facility] or call [phone] for assistance.        Care  "EveryWhere ID     This is your Care EveryWhere ID. This could be used by other organizations to access your [facility] medical records  [id number]        Your Vitals Were     Pulse Temperature Respirations Height BMI (Body Mass Index)       79 97.7  F (36.5  C) 16 5' 7.32\" (171 cm) 38.56 kg/m2        Blood Pressure from Last 3 Encounters:   02/19/18 149/83   01/06/18 (!) 146/93   02/09/17 130/74    Weight from Last 3 Encounters:   02/19/18 248 lb 9.6 oz (112.8 kg)   02/09/17 257 lb 9.6 oz (116.8 kg)   01/04/16 239 lb 6.4 oz (108.6 kg)              Today, you had the following     No orders found for display         Where to get your medicines      These medications were sent to [facility] [place], [place] - [address]   [address] [facility] [address]     Phone:  [phone]     carBAMazepine 300 MG 12 hr capsule    folic acid 1 MG tablet         Some of these will need a paper prescription and others can be bought over the counter.  Ask your nurse if you have questions.     Bring a paper prescription for each of these medications     LORazepam 1 MG tablet          Primary Care Provider Office Phone # Fax #    Cherie Cale Garcia [phone]       [facility] [address] [facility] [facility] [address] [address]        Equal Access to Services     EALR BROWN AH: Hadii reji jacksono Soni, waaxda luqadaha, qaybta kaalmada adeegyada, rosa mehta. So [facility] [phone].    ATENCIÓN: Si habla español, tiene a manzano disposición servicios gratuitos de asistencia lingüística. Llame al [phone].    We comply with applicable federal civil rights laws and [place] laws. We do not discriminate on the basis of race, color, national origin, age, disability, sex, sexual orientation, or gender identity.            Thank you!     Thank you for choosing [facility]  for your care. Our goal is always to provide you with excellent care. Hearing " back from our patients is one way we can continue to improve our services. Please take a few minutes to complete the written survey that you may receive in the mail after your visit with us. Thank you!             Your Updated Medication List - Protect others around you: Learn how to safely use, store and throw away your medicines at www.disposemymeds.org.          This list is accurate as of 2/19/18 10:10 AM.  Always use your most recent med list.                   Brand Name Dispense Instructions for use Diagnosis    carBAMazepine 300 MG 12 hr capsule    CARBATROL    90 capsule    TAKE ONE CAPSULE BY MOUTH DAILY IN THE MORNING AND TWO CAPSULES DAILY IN THE EVENING    Seizure disorder (H)       folic acid 1 MG tablet    FOLVITE    60 tablet    Take 2 tablets (2 mg) by mouth daily    Seizure disorder (H)       LORazepam 1 MG tablet    ATIVAN    15 tablet    Take 1 tablet (1 mg) by mouth every 8 hours as needed for anxiety or seizures    Seizure disorder (H)       rOPINIRole 0.5 MG tablet    REQUIP    30 tablet    Take 1 tablet (0.5 mg) by mouth At Bedtime    Restless leg syndrome

## 2018-02-19 NOTE — PATIENT INSTRUCTIONS
Times of Days am  pm        Medication Tablet Size Number of Tablets/Capsules Total Daily Dosage    Tegretol 300 1 2        900 mg                                                                                                                                   Carry this with you at all times.  CONTINUE TAKING YOUR OTHER MEDICATIONS AS PREVIOUSLY DIRECTED.      * * *Do not store medications in the bathroom.  Keep medications away from children!* * *

## 2018-02-19 NOTE — PROGRESS NOTES
"CC: Follow up for seizures.  HPI: Ms. Tita Evans came back to Epilepsy Clinic today for follow up. She is by herself in the clinic today. She is a 21-year-old, right-handed female with a history of seizures since 2000.  She had seen Dr. Villa from [facility] in the past. Since the last visit,  she had two seizures within one day on Jan. 8th, 2018.  The seizures were her usual seizures with right leg jerking movement that lasted for 1-2 min each, no loss of consciousness or amnesia.  She denied missing any medications.  She was sleep deprived and was sick with URI for a few days around that time.  She was sent to ED and her tegretol level was 9.7.  No medication changes were made.  Her last seizure before this was in Oc.t 2015, she had two seizures in Oct 2015 on the same day.  She is currently taking Carbamazepine 300 mg-600 mg.  No side effect reported.  She is compliant with her medications.   She was also diagnosed with \"restless leg syndrome\".  She is currently on Requip 05 mg qhs, and it is working very well to control her symptoms.  She also mentioned that exercise helped a lot for her RLS.  If she does not exercise, she will have a lot of problems with RLS.   Her seizures started in 2000. Her typical seizures are described as she has an aura of right arm and leg tingling achy sensation, then followed by right arm and leg shaking arrhythmically which lasts for about 30 seconds to 1 minute. She was started on carbamazepine many years ago, and her seizures were well controlled until 05/2009 when she was trying to wean herself off the carbamazepine, then she started seizure recurrence. She had a series of a generalized tonic-clonic seizures at that time, so she was put back on carbamazepine. Her seizures usually occur in the early morning. In 12/2011, when she was seeing Dr. Villa, he was trying to wean the patient off the carbamazepine again, and in February, after she was off the medication for a " "week, then she had another simple partial seizure with the right arm and the leg jerking for about 1 minute,   RISK FACTORS FOR SEIZURES: She had no history of head trauma with loss of consciousness. No history of CNS infections. No history of febrile convulsions. TRIGGERS FOR SEIZURES Unknown.   PAST MEDICAL HISTORY: None.   PAST SURGICAL HISTORY: None.   FAMILY HISTORY: No family history of seizures. Grandmother had a history of diabetes. Aunt had a history of brain hemorrhage. Mother and sister have RLS.    Current Outpatient Prescriptions   Medication Sig Dispense Refill     carBAMazepine (CARBATROL) 300 MG 12 hr capsule TAKE ONE CAPSULE BY MOUTH DAILY IN THE MORNING AND TWO CAPSULES DAILY IN THE EVENING 90 capsule 0     LORazepam (ATIVAN) 1 MG tablet Take 1 tablet (1 mg) by mouth every 8 hours as needed for anxiety or seizures 15 tablet 0     rOPINIRole (REQUIP) 0.5 MG tablet Take 1 tablet (0.5 mg) by mouth At Bedtime 30 tablet 11     folic acid (FOLVITE) 1 MG tablet Take 2 tablets (2 mg) by mouth daily 60 tablet 11       ALLERGIES: No known drug allergies.   SOCIAL HISTORY: She is single, living with her mother. No smoking, no alcohol, no drug abuse.  She is in college, major in social work.  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  REVIEW OF SYSTEMS: The 10-point review of systems are completely negative except for seizures.   PREVIOUS DIAGNOSTIC TESTING: She had an MRI and EEG studies over 10 years ago. Results are not available at this time.   PHYSICAL EXAMINATION:  Blood pressure 149/83, pulse 79, temperature 97.7  F (36.5  C), resp. rate 16, height 5' 7.32\" (171 cm), weight 248 lb 9.6 oz (112.8 kg), not currently breastfeeding.    General exam: General Appearance: No acute distress. HEENT: Normocephalic, atraumatic. Neck: Supple. Extremities: No edema, no clubbing, no cyanosis.     Neurologic Exam: Alert and oriented x3. Speech fluent, appropriate. Normal attention. Cranial " Nerves: Pupils are equal, round, reactive to light and accomodation. Extraocular movement intact. No facial weakness or asymmetry. Hearing normal. Motor Exam: Normal. Coordination:no ataxia. Gait and Station: normal.    Labs:    Component      Latest Ref Rng & Units 1/6/2018   Carbamazepine Total Level      4.0 - 12.0 ug/mL 9.7   10, 11 Epoxide Level      0.4 - 4.0 ug/mL 1.4   Free Carbamazepine Level Ug/Ml      0.6 - 4.2 ug/mL 1.8   Free Epoxide Level      0.2 - 2.0 ug/mL 0.6     IMPRESSION:   1. Epilepsy.  She is a 21-year-old, right-handed female with a history of seizures since 2000.  She had seen Dr. Villa from [facility] in the past. Since the last visit,  she had two seizures within one day on Jan. 8th, 2018.  The seizures were her usual seizures with right leg jerking movement that lasted for 1-2 min each, no loss of consciousness or amnesia.  She denied missing any medications.  She was sleep deprived and was sick with URI for a few days around that time.  She was sent to ED and her tegretol level was 9.7.  No medication changes were made.  Her last seizure before this was in Oc.t 2015, she had two seizures in Oct 2015 on the same day.  She is currently taking Carbamazepine 300 mg-600 mg.  No side effect reported.  She is compliant with her medications.   Patient was consulted regarding pregnancy and the possible fetal malformation with carbamazepine.  She voices understanding the the increased risk of fetal malformation with AEDs.  She is planning to get pregnant in about one year.    2. Restless leg syndrome. Improved. Continue on Requip.  She is currently on Requip 05 mg qhs, and it is working very well to control her symptoms.  She also mentioned that exercise helped a lot for her RLS.  If she does not exercise, she will have a lot of problems with RLS.    PLAN:   1. Continue carbamazepine 300 mg in a.m. 600 mg p.m.    2. Continue Requip 0.5 mg qhs.  3. Folic acid 2 mg qd.  4. RTC in 12  months.    25 min was spent on the visit.  Over 50% of the time was spent on education, counseling about optimal seizure control and coordination of care.

## 2018-02-22 ENCOUNTER — OFFICE VISIT (OUTPATIENT)
Dept: FAMILY MEDICINE | Facility: CLINIC | Age: 23
End: 2018-02-22
Payer: COMMERCIAL

## 2018-02-22 VITALS
HEIGHT: 67 IN | TEMPERATURE: 99.2 F | BODY MASS INDEX: 39.11 KG/M2 | WEIGHT: 249.2 LBS | OXYGEN SATURATION: 100 % | HEART RATE: 98 BPM | DIASTOLIC BLOOD PRESSURE: 70 MMHG | RESPIRATION RATE: 16 BRPM | SYSTOLIC BLOOD PRESSURE: 128 MMHG

## 2018-02-22 DIAGNOSIS — J02.0 ACUTE STREPTOCOCCAL PHARYNGITIS: Primary | ICD-10-CM

## 2018-02-22 PROBLEM — K58.0 IRRITABLE BOWEL SYNDROME WITH DIARRHEA: Status: ACTIVE | Noted: 2017-01-12

## 2018-02-22 PROBLEM — E66.01 MORBID OBESITY WITH BMI OF 40.0-44.9, ADULT (H): Status: ACTIVE | Noted: 2017-01-12

## 2018-02-22 LAB
DEPRECATED S PYO AG THROAT QL EIA: ABNORMAL
FLUAV+FLUBV AG SPEC QL: NEGATIVE
FLUAV+FLUBV AG SPEC QL: NEGATIVE
SPECIMEN SOURCE: ABNORMAL
SPECIMEN SOURCE: NORMAL

## 2018-02-22 PROCEDURE — 87880 STREP A ASSAY W/OPTIC: CPT | Performed by: NURSE PRACTITIONER

## 2018-02-22 PROCEDURE — 87804 INFLUENZA ASSAY W/OPTIC: CPT | Performed by: NURSE PRACTITIONER

## 2018-02-22 PROCEDURE — 99213 OFFICE O/P EST LOW 20 MIN: CPT | Performed by: NURSE PRACTITIONER

## 2018-02-22 RX ORDER — PENICILLIN V POTASSIUM 500 MG/1
500 TABLET, FILM COATED ORAL 2 TIMES DAILY
Qty: 20 TABLET | Refills: 0 | Status: SHIPPED | OUTPATIENT
Start: 2018-02-22 | End: 2018-12-05

## 2018-02-22 NOTE — LETTER
[facility]  [address] [address]  Phone: [phone]    02/22/18    Tita Prieto  [address] [address]      To whom it may concern:     Tita was treated today in clinic. Please excuse her from missed work. She may return when her symptoms have improved. I expect her to improve over the next 1-3 days.     Sincerely,      HEENA Cobb CNP

## 2018-02-22 NOTE — MR AVS SNAPSHOT
After Visit Summary   2/22/2018    Tita Prieto    MRN: [id number]           Patient Information     Date Of Birth          1995        Visit Information        Provider Department      2/22/2018 9:20 AM Minal Vidal APRN [facility]        Today's Diagnoses     Acute streptococcal pharyngitis    -  1      Care Instructions      Pharyngitis: Strep (Confirmed)    You have had a positive test for strep throat. Strep throat is a contagious illness. It is spread by coughing, kissing or by touching others after touching your mouth or nose. Symptoms include throat pain that is worse with swallowing, aching all over, headache, and fever. It is treated with antibiotic medicine. This should help you start to feel better in 1 to 2 days.  Home care    Rest at home. Drink plenty of fluids to you won't get dehydrated.    No work or school for the first 2 days of taking the antibiotics. After this time, you will not be contagious. You can then return to school or work if you are feeling better.     Take antibiotic medicine for the full 10 days, even if you feel better. This is very important to ensure the infection is treated. It is also important to prevent medicine-resistant germs from developing. If you were given an antibiotic shot, you don't need any more antibiotics.    You may use acetaminophen or ibuprofen to control pain or fever, unless another medicine was prescribed for this. Talk with your doctor before taking these medicines if you have chronic liver or kidney disease. Also talk with your doctor if you have had a stomach ulcer or GI bleeding.    Throat lozenges or sprays help reduce pain. Gargling with warm saltwater will also reduce throat pain. Dissolve 1/2 teaspoon of salt in 1 glass of warm water. This may be useful just before meals.     Soft foods are OK. Avoid salty or spicy foods.  Follow-up care  Follow up with your healthcare provider or our staff if you don't  "get better over the next week.  When to seek medical advice  Call your healthcare provider right away if any of these occur:    Fever of 100.4 F (38 C) or higher, or as directed by your healthcare provider    New or worsening ear pain, sinus pain, or headache    Painful lumps in the back of neck    Stiff neck    Lymph nodes getting larger or becoming soft in the middle    You can't swallow liquids or you can't open your mouth wide because of throat pain    Signs of dehydration. These include very dark urine or no urine, sunken eyes, and dizziness.    Trouble breathing or noisy breathing    Muffled voice    Rash  Date Last Reviewed: 4/13/2015 2000-2017 [employer]. [address] [place] [address], [place] [address]. All rights reserved. This information is not intended as a substitute for professional medical care. Always follow your healthcare professional's instructions.                Follow-ups after your visit        Who to contact     If you have questions or need follow up information about today's clinic visit or your schedule please contact [facility] directly at [phone].  Normal or non-critical lab and imaging results will be communicated to you by [employer], letter or phone within 4 business days after the clinic has received the results. If you do not hear from us within 7 days, please contact the clinic through [employer] or phone. If you have a critical or abnormal lab result, we will notify you by phone as soon as possible.  Submit refill requests through [employer] or call your pharmacy and they will forward the refill request to us. Please allow 3 business days for your refill to be completed.          Additional Information About Your Visit        [employer] Information     [employer] lets you send messages to your doctor, view your test results, renew your prescriptions, schedule appointments and more. To sign up, go to [email or web address] . Click on \"Log in\" on the left side of " "the screen, which will take you to the Welcome page. Then click on \"Sign up Now\" on the right side of the page.     You will be asked to enter the access code listed below, as well as some personal information. Please follow the directions to create your username and password.     Your access code is: [email or web address]  Expires: 2018 10:21 PM     Your access code will  in 90 days. If you need help or a new code, please call your [facility] or [phone].        Care EveryWhere ID     This is your Care EveryWhere ID. This could be used by other organizations to access your [facility] medical records  [id number]        Your Vitals Were     Pulse Temperature Respirations Height Pulse Oximetry BMI (Body Mass Index)    98 99.2  F (37.3  C) (Tympanic) 16 5' 7.3\" (1.709 m) 100% 38.68 kg/m2       Blood Pressure from Last 3 Encounters:   18 128/70   18 149/83   18 (!) 146/93    Weight from Last 3 Encounters:   18 249 lb 3.2 oz (113 kg)   18 248 lb 9.6 oz (112.8 kg)   17 257 lb 9.6 oz (116.8 kg)              We Performed the Following     Influenza A/B antigen     Strep, Rapid Screen          Today's Medication Changes          These changes are accurate as of 18  9:25 AM.  If you have any questions, ask your nurse or doctor.               Start taking these medicines.        Dose/Directions    penicillin V potassium 500 MG tablet   Commonly known as:  VEETID   Used for:  Acute streptococcal pharyngitis   Started by:  Minal Vidal APRN CNP        Dose:  500 mg   Take 1 tablet (500 mg) by mouth 2 times daily   Quantity:  20 tablet   Refills:  0            Where to get your medicines      These medications were sent to [facility] - [place], [place] - [address] [facility] [address]     Phone:  [phone]     penicillin V potassium 500 MG tablet                Primary Care Provider Office Phone # Fax #    Cherie Garcia" [phone]       [facility] [address] [facility] [address]        Equal Access to Services     EARL BROWN : Abhay Fernandez, domi olvera, rosa floriansimonlogan mehta. So [facility] [phone].    ATENCIÓN: Si habla español, tiene a manzano disposición servicios gratuitos de asistencia lingüística. Llame al [phone].    We comply with applicable federal civil rights laws and [place] laws. We do not discriminate on the basis of race, color, national origin, age, disability, sex, sexual orientation, or gender identity.            Thank you!     Thank you for choosing [facility]  for your care. Our goal is always to provide you with excellent care. Hearing back from our patients is one way we can continue to improve our services. Please take a few minutes to complete the written survey that you may receive in the mail after your visit with us. Thank you!             Your Updated Medication List - Protect others around you: Learn how to safely use, store and throw away your medicines at www.disposemymeds.org.          This list is accurate as of 2/22/18  9:25 AM.  Always use your most recent med list.                   Brand Name Dispense Instructions for use Diagnosis    carBAMazepine 300 MG 12 hr capsule    CARBATROL    90 capsule    TAKE ONE CAPSULE BY MOUTH DAILY IN THE MORNING AND TWO CAPSULES DAILY IN THE EVENING    Seizure disorder (H)       folic acid 1 MG tablet    FOLVITE    60 tablet    Take 2 tablets (2 mg) by mouth daily    Seizure disorder (H)       LORazepam 1 MG tablet    ATIVAN    15 tablet    Take 1 tablet (1 mg) by mouth every 8 hours as needed for anxiety or seizures    Seizure disorder (H)       penicillin V potassium 500 MG tablet    VEETID    20 tablet    Take 1 tablet (500 mg) by mouth 2 times daily    Acute streptococcal pharyngitis       rOPINIRole 0.5 MG tablet    REQUIP    30 tablet     Take 1 tablet (0.5 mg) by mouth At Bedtime    Restless leg syndrome

## 2018-02-22 NOTE — PROGRESS NOTES
"  SUBJECTIVE:   Tita Prieto is a 22 year old female who presents to clinic today for the following health issues:      ENT Symptoms             Symptoms: cc Present Absent Comment   Fever/Chills  x  Highest temp was 101 yesterday   Fatigue  x     Muscle Aches  x     Eye Irritation  x  redness   Sneezing   x    Nasal Michel/Drg  x     Sinus Pressure/Pain  x     Loss of smell   x    Dental pain   x    Sore Throat  x     Swollen Glands  x     Ear Pain/Fullness  x  Left ear   Cough   x    Wheeze   x    Chest Pain   x    Shortness of breath  x     Rash   x    Other         Symptom duration:  3 days   Symptom severity:  mild   Treatments tried:  advil   Contacts:  works at        Problem list and histories reviewed & adjusted, as indicated.  Additional history: as documented    Patient Active Problem List   Diagnosis     Hand weakness     Guillain Barré syndrome (H)     Restless legs syndrome (RLS)     Seizure disorder (H)     Morbid obesity with BMI of 40.0-44.9, adult (H)     Mixed hyperlipidemia     Irritable bowel syndrome with diarrhea     Contraception     Past Surgical History:   Procedure Laterality Date     SURGICAL HISTORY OF -   06/03/1994    PE tubes       Social History   Substance Use Topics     Smoking status: Never Smoker     Smokeless tobacco: Never Used     Alcohol use No     History reviewed. No pertinent family history.        Reviewed and updated as needed this visit by clinical staff       Reviewed and updated as needed this visit by Provider         ROS:  Constitutional, HEENT, cardiovascular, pulmonary, gi and gu systems are negative, except as otherwise noted.    OBJECTIVE:     /70 (BP Location: Right arm, Patient Position: Sitting, Cuff Size: Adult Large)  Pulse 98  Temp 99.2  F (37.3  C) (Tympanic)  Resp 16  Ht 5' 7.3\" (1.709 m)  Wt 249 lb 3.2 oz (113 kg)  SpO2 100%  BMI 38.68 kg/m2  Body mass index is 38.68 kg/(m^2).  GENERAL: healthy, alert and no distress  EYES: Eyes " grossly normal to inspection, PERRL and conjunctivae and sclerae normal  HENT: normal cephalic/atraumatic, both ears: clear effusion, nose and mouth without ulcers or lesions, oropharynx clear, oral mucous membranes moist, tonsillar erythema and sinuses: not tender  NECK: bilateral anterior cervical adenopathy and no asymmetry, masses, or scars  RESP: lungs clear to auscultation - no rales, rhonchi or wheezes  CV: regular rates and rhythm, normal S1 S2, no S3 or S4 and no murmur, click or rub  MS: no gross musculoskeletal defects noted, no edema  SKIN: no suspicious lesions or rashes  NEURO: Normal strength and tone, mentation intact and speech normal    Diagnostic Test Results:  Results for orders placed or performed in visit on 02/22/18 (from the past 24 hour(s))   Strep, Rapid Screen   Result Value Ref Range    Specimen Description Throat     Rapid Strep A Screen (A)      POSITIVE: Group A Streptococcal antigen detected by immunoassay.       ASSESSMENT/PLAN:       ICD-10-CM    1. Acute streptococcal pharyngitis J02.0 Influenza A/B antigen     Strep, Rapid Screen     penicillin V potassium (VEETID) 500 MG tablet       FUTURE APPOINTMENTS:       - Follow up in 3 days for symptoms that are not improving, sooner for new or worsening sx.     Patient Instructions     Pharyngitis: Strep (Confirmed)    You have had a positive test for strep throat. Strep throat is a contagious illness. It is spread by coughing, kissing or by touching others after touching your mouth or nose. Symptoms include throat pain that is worse with swallowing, aching all over, headache, and fever. It is treated with antibiotic medicine. This should help you start to feel better in 1 to 2 days.  Home care    Rest at home. Drink plenty of fluids to you won't get dehydrated.    No work or school for the first 2 days of taking the antibiotics. After this time, you will not be contagious. You can then return to school or work if you are feeling  better.     Take antibiotic medicine for the full 10 days, even if you feel better. This is very important to ensure the infection is treated. It is also important to prevent medicine-resistant germs from developing. If you were given an antibiotic shot, you don't need any more antibiotics.    You may use acetaminophen or ibuprofen to control pain or fever, unless another medicine was prescribed for this. Talk with your doctor before taking these medicines if you have chronic liver or kidney disease. Also talk with your doctor if you have had a stomach ulcer or GI bleeding.    Throat lozenges or sprays help reduce pain. Gargling with warm saltwater will also reduce throat pain. Dissolve 1/2 teaspoon of salt in 1 glass of warm water. This may be useful just before meals.     Soft foods are OK. Avoid salty or spicy foods.  Follow-up care  Follow up with your healthcare provider or our staff if you don't get better over the next week.  When to seek medical advice  Call your healthcare provider right away if any of these occur:    Fever of 100.4 F (38 C) or higher, or as directed by your healthcare provider    New or worsening ear pain, sinus pain, or headache    Painful lumps in the back of neck    Stiff neck    Lymph nodes getting larger or becoming soft in the middle    You can't swallow liquids or you can't open your mouth wide because of throat pain    Signs of dehydration. These include very dark urine or no urine, sunken eyes, and dizziness.    Trouble breathing or noisy breathing    Muffled voice    Rash  Date Last Reviewed: 4/13/2015 2000-2017 [employer]. [address] [place] [address], [place] [id number]. All rights reserved. This information is not intended as a substitute for professional medical care. Always follow your healthcare professional's instructions.            HEENA Cobb [facility]

## 2018-02-22 NOTE — NURSING NOTE
"Chief Complaint   Patient presents with     Cold Symptoms       Initial /70 (BP Location: Right arm, Patient Position: Sitting, Cuff Size: Adult Large)  Pulse 98  Temp 99.2  F (37.3  C) (Tympanic)  Resp 16  Ht 5' 7.3\" (1.709 m)  Wt 249 lb 3.2 oz (113 kg)  SpO2 100%  BMI 38.68 kg/m2 Estimated body mass index is 38.68 kg/(m^2) as calculated from the following:    Height as of this encounter: 5' 7.3\" (1.709 m).    Weight as of this encounter: 249 lb 3.2 oz (113 kg).  Medication Reconciliation: complete  "

## 2018-02-22 NOTE — PATIENT INSTRUCTIONS
Pharyngitis: Strep (Confirmed)    You have had a positive test for strep throat. Strep throat is a contagious illness. It is spread by coughing, kissing or by touching others after touching your mouth or nose. Symptoms include throat pain that is worse with swallowing, aching all over, headache, and fever. It is treated with antibiotic medicine. This should help you start to feel better in 1 to 2 days.  Home care    Rest at home. Drink plenty of fluids to you won't get dehydrated.    No work or school for the first 2 days of taking the antibiotics. After this time, you will not be contagious. You can then return to school or work if you are feeling better.     Take antibiotic medicine for the full 10 days, even if you feel better. This is very important to ensure the infection is treated. It is also important to prevent medicine-resistant germs from developing. If you were given an antibiotic shot, you don't need any more antibiotics.    You may use acetaminophen or ibuprofen to control pain or fever, unless another medicine was prescribed for this. Talk with your doctor before taking these medicines if you have chronic liver or kidney disease. Also talk with your doctor if you have had a stomach ulcer or GI bleeding.    Throat lozenges or sprays help reduce pain. Gargling with warm saltwater will also reduce throat pain. Dissolve 1/2 teaspoon of salt in 1 glass of warm water. This may be useful just before meals.     Soft foods are OK. Avoid salty or spicy foods.  Follow-up care  Follow up with your healthcare provider or our staff if you don't get better over the next week.  When to seek medical advice  Call your healthcare provider right away if any of these occur:    Fever of 100.4 F (38 C) or higher, or as directed by your healthcare provider    New or worsening ear pain, sinus pain, or headache    Painful lumps in the back of neck    Stiff neck    Lymph nodes getting larger or becoming soft in the  middle    You can't swallow liquids or you can't open your mouth wide because of throat pain    Signs of dehydration. These include very dark urine or no urine, sunken eyes, and dizziness.    Trouble breathing or noisy breathing    Muffled voice    Rash  Date Last Reviewed: 4/13/2015 2000-2017 [employer]. [address] [place] [address], [place] [address]. All rights reserved. This information is not intended as a substitute for professional medical care. Always follow your healthcare professional's instructions.

## 2018-02-25 DIAGNOSIS — G25.81 RESTLESS LEG SYNDROME: ICD-10-CM

## 2018-03-02 RX ORDER — ROPINIROLE 0.5 MG/1
TABLET, FILM COATED ORAL
Qty: 30 TABLET | Refills: 11 | OUTPATIENT
Start: 2018-03-02

## 2018-03-11 DIAGNOSIS — G25.81 RESTLESS LEG SYNDROME: ICD-10-CM

## 2018-03-12 RX ORDER — ROPINIROLE 0.5 MG/1
TABLET, FILM COATED ORAL
Qty: 90 TABLET | Refills: 3 | Status: SHIPPED | OUTPATIENT
Start: 2018-03-12 | End: 2019-02-19

## 2018-08-18 ENCOUNTER — HOSPITAL ENCOUNTER (EMERGENCY)
Facility: CLINIC | Age: 23
Discharge: HOME | End: 2018-08-18
Attending: EMERGENCY MEDICINE | Admitting: EMERGENCY MEDICINE
Payer: COMMERCIAL

## 2018-08-18 VITALS — TEMPERATURE: 97.8 F | RESPIRATION RATE: 14 BRPM | OXYGEN SATURATION: 98 % | HEIGHT: 68 IN | HEART RATE: 73 BPM

## 2018-08-18 DIAGNOSIS — S61.012A LACERATION OF LEFT THUMB WITHOUT FOREIGN BODY WITHOUT DAMAGE TO NAIL, INITIAL ENCOUNTER: ICD-10-CM

## 2018-08-18 PROCEDURE — 25000128 H RX IP 250 OP 636: Performed by: EMERGENCY MEDICINE

## 2018-08-18 PROCEDURE — 99283 EMERGENCY DEPT VISIT LOW MDM: CPT | Mod: 25 | Performed by: EMERGENCY MEDICINE

## 2018-08-18 PROCEDURE — 99282 EMERGENCY DEPT VISIT SF MDM: CPT | Mod: 25 | Performed by: EMERGENCY MEDICINE

## 2018-08-18 PROCEDURE — 12001 RPR S/N/AX/GEN/TRNK 2.5CM/<: CPT | Mod: FA | Performed by: EMERGENCY MEDICINE

## 2018-08-18 PROCEDURE — 90715 TDAP VACCINE 7 YRS/> IM: CPT | Performed by: EMERGENCY MEDICINE

## 2018-08-18 ASSESSMENT — ENCOUNTER SYMPTOMS
WOUND: 1
NUMBNESS: 0
WEAKNESS: 0
FEVER: 0

## 2018-08-18 NOTE — ED AVS SNAPSHOT
[facility] Emergency Department    [address] [facility] [address]    Phone:  [phone]    Fax:  [phone]                                       Tita Prieto   MRN: [id number]    Department:  [facility] Emergency Department   Date of Visit:  8/18/2018           After Visit Summary Signature Page     I have received my discharge instructions, and my questions have been answered. I have discussed any challenges I see with this plan with the nurse or doctor.    ..........................................................................................................................................  Patient/Patient Representative Signature      ..........................................................................................................................................  Patient Representative Print Name and Relationship to Patient    ..................................................               ................................................  Date                                            Time    ..........................................................................................................................................  Reviewed by Signature/Title    ...................................................              ..............................................  Date                                                            Time

## 2018-08-18 NOTE — ED AVS SNAPSHOT
[facility] Emergency Department    [address] [address]    Phone:  [phone]    Fax:  [phone]                                       Tita Prieto   MRN: [id number]    Department:  [facility] Emergency Department   Date of Visit:  8/18/2018           Patient Information     Date Of Birth          1995        Your diagnoses for this visit were:     Laceration of left thumb without foreign body without damage to nail, initial encounter        You were seen by Td Ferrer MD.      Follow-up Information     Follow up with Cherie Garcia MD. Schedule an appointment as soon as possible for a visit in 1 week.    Specialty:  Family Practice    Why:  As needed for follow up    Contact information:    [facility]  [address] [address]  [phone]          Discharge Instructions         Extremity Laceration: Skin Glue  A laceration is a cut through the skin. You have a laceration that has been closed with skin glue. This is used on cuts that have smooth edges and are not infected. It's best used on straight, clean cuts on areas that do not get a lot of tension.  You may need a tetanus shot. This is given if you have no record of a shot, and the object that caused the cut may lead to tetanus.  Home care    Your healthcare provider may prescribe an antibiotic. This is to help prevent infection. Follow all instructions for taking this medicine. Take the medicine every day until it is gone or you are told to stop. You should not have any left over.    The healthcare provider may prescribe medicines for pain. Follow instructions for taking them.    Follow the healthcare provider s instructions on how to care for the cut.    No bandage is needed. Skin glue peels off on its own within 5 to 10 days. Most skin wounds heal within 10 days.    Keep the wound clean. You may shower or bathe as usual, but do not use soaps, lotions, or  ointments on the wound area. Do not scrub the wound. After bathing, pat the wound dry with a soft towel.    Don't scratch, rub, or pick at the film. Don't place tape directly over the film.    Don't put liquids such as peroxide, ointments, or creams on the wound while the skin glue is in place. Many oil based products can weaken and dissolve the glue.    Don't do any activities that may reinjure your wound.    Don't do any activities that cause heavy sweating. Protect the wound from sunlight.    Most skin wounds heal without problems. But an infection sometimes occurs even with proper treatment. Watch for the signs of infection listed below.  Follow-up care  Follow up as directed with your healthcare provider, or as advised.  When to seek medical advice  Call your healthcare provider right away if any of these occur:    Wound bleeding not controlled by direct pressure    Signs of infection, including increasing pain in the wound, increasing wound redness or swelling, or pus or bad odor coming from the wound    Fever of 100.4 F (38. C) or higher, or as directed by your healthcare provider    Wound edges reopen    Wound changes colors    Numbness around the wound     Decreased movement around the injured area  Date Last Reviewed: 7/1/2017 2000-2017 [employer]. [address] [place] [address], [place] [address]. All rights reserved. This information is not intended as a substitute for professional medical care. Always follow your healthcare professional's instructions.          24 Hour Appointment Hotline       To make an appointment at any [facility], call [phone] ([phone]). If you don't have a family doctor or clinic, we will help you find one. [facility] are conveniently located to serve the needs of you and your family.             Review of your medicines      Our records show that you are taking the medicines listed below. If these are incorrect, please call your family doctor or  clinic.        Dose / Directions Last dose taken    carBAMazepine 300 MG 12 hr capsule   Commonly known as:  CARBATROL   Quantity:  90 capsule        TAKE ONE CAPSULE BY MOUTH DAILY IN THE MORNING AND TWO CAPSULES DAILY IN THE EVENING   Refills:  11        folic acid 1 MG tablet   Commonly known as:  FOLVITE   Dose:  2 mg   Quantity:  60 tablet        Take 2 tablets (2 mg) by mouth daily   Refills:  11        LORazepam 1 MG tablet   Commonly known as:  ATIVAN   Dose:  1 mg   Quantity:  15 tablet        Take 1 tablet (1 mg) by mouth every 8 hours as needed for anxiety or seizures   Refills:  0        penicillin V potassium 500 MG tablet   Commonly known as:  VEETID   Dose:  500 mg   Quantity:  20 tablet        Take 1 tablet (500 mg) by mouth 2 times daily   Refills:  0        rOPINIRole 0.5 MG tablet   Commonly known as:  REQUIP   Quantity:  90 tablet        TAKE ONE TABLET BY MOUTH AT BEDTIME   Refills:  3                Procedures and tests performed during your visit     Laceration repair      Orders Needing Specimen Collection     None      Pending Results     No orders found from 8/16/2018 to 8/19/2018.            Pending Culture Results     No orders found from 8/16/2018 to 8/19/2018.            Pending Results Instructions     If you had any lab results that were not finalized at the time of your Discharge, you can call the ED Lab Result RN at [phone]. You will be contacted by this team for any positive Lab results or changes in treatment. The nurses are available 7 days a week from 10A to 6:30P.  You can leave a message 24 hours per day and they will return your call.        Test Results From Your Hospital Stay               Thank you for choosing [facility]       Thank you for choosing [facility] for your care. Our goal is always to provide you with excellent care. Hearing back from our patients is one way we can continue to improve our services. Please take a few minutes to complete the written survey that  "you may receive in the mail after you visit with us. Thank you!        [employer] Information     [employer] lets you send messages to your doctor, view your test results, renew your prescriptions, schedule appointments and more. To sign up, go to [email or web address] . Click on \"Log in\" on the left side of the screen, which will take you to the Welcome page. Then click on \"Sign up Now\" on the right side of the page.     You will be asked to enter the access code listed below, as well as some personal information. Please follow the directions to create your username and password.     Your access code is: [email or web address]  Expires: 2018 11:01 PM     Your access code will  in 90 days. If you need help or a new code, please call your [facility] clinic or [phone].        Care EveryWhere ID     This is your Care EveryWhere ID. This could be used by other organizations to access your [facility] medical records  [id number]        Equal Access to Services     EARL BROWN : Hadii reji jacksono Soni, waaxda luqadaha, qaybta kaalmada adegrady, rosa miles . So [facility] [phone].    ATENCIÓN: Si habla español, tiene a manzano disposición servicios gratuitos de asistencia lingüística. Llame al [phone].    We comply with applicable federal civil rights laws and [place] laws. We do not discriminate on the basis of race, color, national origin, age, disability, sex, sexual orientation, or gender identity.            After Visit Summary       This is your record. Keep this with you and show to your community pharmacist(s) and doctor(s) at your next visit.                  "

## 2018-08-19 NOTE — DISCHARGE INSTRUCTIONS
Extremity Laceration: Skin Glue  A laceration is a cut through the skin. You have a laceration that has been closed with skin glue. This is used on cuts that have smooth edges and are not infected. It's best used on straight, clean cuts on areas that do not get a lot of tension.  You may need a tetanus shot. This is given if you have no record of a shot, and the object that caused the cut may lead to tetanus.  Home care    Your healthcare provider may prescribe an antibiotic. This is to help prevent infection. Follow all instructions for taking this medicine. Take the medicine every day until it is gone or you are told to stop. You should not have any left over.    The healthcare provider may prescribe medicines for pain. Follow instructions for taking them.    Follow the healthcare provider s instructions on how to care for the cut.    No bandage is needed. Skin glue peels off on its own within 5 to 10 days. Most skin wounds heal within 10 days.    Keep the wound clean. You may shower or bathe as usual, but do not use soaps, lotions, or ointments on the wound area. Do not scrub the wound. After bathing, pat the wound dry with a soft towel.    Don't scratch, rub, or pick at the film. Don't place tape directly over the film.    Don't put liquids such as peroxide, ointments, or creams on the wound while the skin glue is in place. Many oil based products can weaken and dissolve the glue.    Don't do any activities that may reinjure your wound.    Don't do any activities that cause heavy sweating. Protect the wound from sunlight.    Most skin wounds heal without problems. But an infection sometimes occurs even with proper treatment. Watch for the signs of infection listed below.  Follow-up care  Follow up as directed with your healthcare provider, or as advised.  When to seek medical advice  Call your healthcare provider right away if any of these occur:    Wound bleeding not controlled by direct pressure    Signs of  infection, including increasing pain in the wound, increasing wound redness or swelling, or pus or bad odor coming from the wound    Fever of 100.4 F (38. C) or higher, or as directed by your healthcare provider    Wound edges reopen    Wound changes colors    Numbness around the wound     Decreased movement around the injured area  Date Last Reviewed: 7/1/2017 2000-2017 [employer]. [address] [place] [address]. All rights reserved. This information is not intended as a substitute for professional medical care. Always follow your healthcare professional's instructions.
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
## 2018-12-05 ENCOUNTER — APPOINTMENT (OUTPATIENT)
Dept: GENERAL RADIOLOGY | Facility: CLINIC | Age: 23
End: 2018-12-05
Attending: NURSE PRACTITIONER
Payer: COMMERCIAL

## 2018-12-05 ENCOUNTER — HOSPITAL ENCOUNTER (EMERGENCY)
Facility: CLINIC | Age: 23
Discharge: HOME | End: 2018-12-05
Attending: NURSE PRACTITIONER | Admitting: NURSE PRACTITIONER
Payer: COMMERCIAL

## 2018-12-05 VITALS
RESPIRATION RATE: 20 BRPM | DIASTOLIC BLOOD PRESSURE: 78 MMHG | SYSTOLIC BLOOD PRESSURE: 133 MMHG | OXYGEN SATURATION: 99 % | HEART RATE: 107 BPM | TEMPERATURE: 98.8 F

## 2018-12-05 DIAGNOSIS — J02.9 ACUTE PHARYNGITIS, UNSPECIFIED ETIOLOGY: ICD-10-CM

## 2018-12-05 DIAGNOSIS — R06.2 WHEEZING: ICD-10-CM

## 2018-12-05 DIAGNOSIS — J20.9 ACUTE BRONCHITIS WITH SYMPTOMS > 10 DAYS: ICD-10-CM

## 2018-12-05 LAB
INTERNAL QC OK POCT: YES
S PYO AG THROAT QL IA.RAPID: NEGATIVE

## 2018-12-05 PROCEDURE — 71046 X-RAY EXAM CHEST 2 VIEWS: CPT

## 2018-12-05 PROCEDURE — G0463 HOSPITAL OUTPT CLINIC VISIT: HCPCS

## 2018-12-05 PROCEDURE — 99213 OFFICE O/P EST LOW 20 MIN: CPT | Performed by: NURSE PRACTITIONER

## 2018-12-05 PROCEDURE — 87880 STREP A ASSAY W/OPTIC: CPT | Performed by: NURSE PRACTITIONER

## 2018-12-05 PROCEDURE — 87081 CULTURE SCREEN ONLY: CPT | Performed by: NURSE PRACTITIONER

## 2018-12-05 RX ORDER — ALBUTEROL SULFATE 90 UG/1
2 AEROSOL, METERED RESPIRATORY (INHALATION) EVERY 6 HOURS PRN
Qty: 1 INHALER | Refills: 0 | Status: SHIPPED | OUTPATIENT
Start: 2018-12-05 | End: 2021-04-01

## 2018-12-05 RX ORDER — AZITHROMYCIN 250 MG/1
TABLET, FILM COATED ORAL
Qty: 6 TABLET | Refills: 0 | Status: ON HOLD | OUTPATIENT
Start: 2018-12-05 | End: 2018-12-10

## 2018-12-05 RX ORDER — PREDNISONE 20 MG/1
TABLET ORAL
Qty: 10 TABLET | Refills: 0 | Status: ON HOLD | OUTPATIENT
Start: 2018-12-05 | End: 2018-12-10

## 2018-12-05 ASSESSMENT — ENCOUNTER SYMPTOMS
FATIGUE: 1
FEVER: 0
COUGH: 1
VOMITING: 0
SHORTNESS OF BREATH: 0
CHILLS: 1
SORE THROAT: 1

## 2018-12-05 NOTE — ED AVS SNAPSHOT
[facility] Emergency Department    [address] [address]    Phone:  [phone]    Fax:  [phone]                                       Tita Prieto   MRN: [id number]    Department:  [facility] Emergency Department   Date of Visit:  12/5/2018           After Visit Summary Signature Page     I have received my discharge instructions, and my questions have been answered. I have discussed any challenges I see with this plan with the nurse or doctor.    ..........................................................................................................................................  Patient/Patient Representative Signature      ..........................................................................................................................................  Patient Representative Print Name and Relationship to Patient    ..................................................               ................................................  Date                                   Time    ..........................................................................................................................................  Reviewed by Signature/Title    ...................................................              ..............................................  Date                                               Time          22EPIC Rev 08/18

## 2018-12-05 NOTE — ED AVS SNAPSHOT
[facility] Emergency Department    [address] [facility] [address]    Phone:  [phone]    Fax:  [phone]                                       Tita Preito   MRN: [id number]    Department:  [facility] Emergency Department   Date of Visit:  12/5/2018           Patient Information     Date Of Birth          1995        Your diagnoses for this visit were:     Acute pharyngitis, unspecified etiology     Acute bronchitis with symptoms > 10 days     Wheezing        You were seen by Genesis Tyson APRN CNP.      Follow-up Information     Follow up with Cherie Garcia MD.    Specialty:  Family Practice    Why:  As needed    Contact information:    [place] [address] [facility] [place] [address]  [phone]          Follow up with [facility] Emergency Department.    Specialty:  EMERGENCY MEDICINE    Why:  If symptoms worsen    Contact information:    [address] [place] [address] [phone]    Additional information:    The medical center is located at   [address] [place] [address]. (between [facility] and   [facility] in [place], four miles north   of [place]).        Discharge Instructions         Z-abigail.  Prednisone 40mg daily for 5 days.  Albuterol inhaler 2 puffs every 6 hours as needed for bronchospasm, wheezing.  Return to the emergency permit for fever, shortness of breath, chest pain, or worse in any way.    What Is Acute Bronchitis?  Acute bronchitis is when the airways in your lungs (bronchial tubes) become red and swollen (inflamed). It is usually caused by a viral infection. But it can also occur because of a bacteria or allergen. Symptoms include a cough that produces yellow or greenish mucus and can last for days or sometimes weeks.  Inside healthy lungs    Air travels in and out of the lungs through the airways. The linings of these airways produce sticky mucus. This mucus traps particles that enter the lungs. Tiny  structures called cilia then sweep the particles out of the airways.     Healthy airway: Airways are normally open. Air moves in and out easily.      Healthy cilia: Tiny, hairlike cilia sweep mucus and particles up and out of the airways.     Lungs with bronchitis  Bronchitis often occurs with a cold or the flu virus. The airways become inflamed (red and swollen). There is a deep hacking cough from the extra mucus. Other symptoms may include:    Wheezing    Chest discomfort    Shortness of breath    Mild fever  A second infection, this time due to bacteria, may then occur. And airways irritated by allergens or smoke are more likely to get infected.        Inflamed airway: Inflammation and extra mucus narrow the airway, causing shortness of breath.      Impaired cilia: Extra mucus impairs cilia, causing congestion and wheezing. Smoking makes the problem worse.     Making a diagnosis  A physical exam, health history, and certain tests help your healthcare provider make the diagnosis.  Health history  Your healthcare provider will ask you about your symptoms.  The exam  Your provider listens to your chest for signs of congestion. He or she may also check your ears, nose, and throat.  Possible tests    A sputum test for bacteria. This requires a sample of mucus from your lungs.    A nasal or throat swab. This tests to see if you have a bacterial infection.    A chest X-ray. This is done if your healthcare provider thinks you have pneumonia.    Tests to check for an underlying condition. Other tests may be done to check for things such as allergies, asthma, or COPD (chronic obstructive pulmonary disease). You may need to see a specialist for more lung function testing.  Treating a cough  The main treatment for bronchitis is easing symptoms. Avoiding smoke, allergens, and other things that trigger coughing can often help. If the infection is bacterial, you may be given antibiotics. During the illness, it's important to  get plenty of sleep. To ease symptoms:    Don t smoke. Also avoid secondhand smoke.    Use a humidifier. Or try breathing in steam from a hot shower. This may help loosen mucus.    Drink a lot of water and juice. They can soothe the throat and may help thin mucus.    Sit up or use extra pillows when in bed. This helps to lessen coughing and congestion.    Ask your provider about using medicine. Ask about using cough medicine, pain and fever medicine, or a decongestant.  Antibiotics  Most cases of bronchitis are caused by cold or flu viruses. They don t need antibiotics to treat them, even if your mucus is thick and green or yellow. Antibiotics don t treat viral illness and antibiotics have not been shown to have any benefit in cases of acute bronchitis. Taking antibiotics when they are not needed increases your risk of getting an infection later that is antibiotic-resistant. Antibiotics can also cause severe cases of diarrhea that require other antibiotics to treat.  It is important that you accept your healthcare provider's opinion to not use antibiotics. Your provider will prescribe antibiotics if the infection is caused by bacteria. If they are prescribed:    Take all of the medicine. Take the medicine until it is used up, even if symptoms have improved. If you don t, the bronchitis may come back.    Take the medicines as directed. For instance, some medicines should be taken with food.    Ask about side effects. Ask your provider or pharmacist what side effects are common, and what to do about them.  Follow-up care  You should see your provider again in 2 to 3 weeks. By this time, symptoms should have improved. An infection that lasts longer may mean you have a more serious problem.  Prevention    Avoid tobacco smoke. If you smoke, quit. Stay away from smoky places. Ask friends and family not to smoke around you, or in your home or car.    Get checked for allergies.    Ask your provider about getting a yearly  flu shot. Also ask about pneumococcal or pneumonia shots.    Wash your hands often. This helps reduce the chance of picking up viruses that cause colds and flu.  Call your healthcare provider if:    Symptoms worsen, or you have new symptoms    Breathing problems worsen or  become severe    Symptoms don t get better within a week, or within 3 days of taking antibiotics   Date Last Reviewed: 2/1/2017 2000-2018 [employer]. [address] [place] [address], [place] [address]. All rights reserved. This information is not intended as a substitute for professional medical care. Always follow your healthcare professional's instructions.          Your next 10 appointments already scheduled     Feb 28, 2019 10:00 AM CST   Return Visit with Maris Otero MD   [facility] ([facility])    [address] [place] [address], [address]  [facility] [address] [phone]              24 Hour Appointment Hotline       To make an appointment at any [facility], call [phone] ([phone]). If you don't have a family doctor or clinic, we will help you find one. [facility] are conveniently located to serve the needs of you and your family.             Review of your medicines      START taking        Dose / Directions Last dose taken    albuterol 108 (90 Base) MCG/ACT inhaler   Commonly known as:  PROAIR HFA/PROVENTIL HFA/VENTOLIN HFA   Dose:  2 puff   Quantity:  1 Inhaler        Inhale 2 puffs into the lungs every 6 hours as needed for shortness of breath / dyspnea or wheezing   Refills:  0        azithromycin 250 MG tablet   Commonly known as:  ZITHROMAX Z-SHIRA   Quantity:  6 tablet        Two tablets on the first day, then one tablet daily for the next 4 days   Refills:  0        predniSONE 20 MG tablet   Commonly known as:  DELTASONE   Quantity:  10 tablet        Take two tablets (= 40mg) each day for 5 (five) days   Refills:  0          Our records show that you are taking the medicines listed  below. If these are incorrect, please call your family doctor or clinic.        Dose / Directions Last dose taken    carBAMazepine 300 MG 12 hr capsule   Commonly known as:  CARBATROL   Quantity:  90 capsule        TAKE ONE CAPSULE BY MOUTH DAILY IN THE MORNING AND TWO CAPSULES DAILY IN THE EVENING   Refills:  11        folic acid 1 MG tablet   Commonly known as:  FOLVITE   Dose:  2 mg   Quantity:  60 tablet        Take 2 tablets (2 mg) by mouth daily   Refills:  11        LORazepam 1 MG tablet   Commonly known as:  ATIVAN   Dose:  1 mg   Quantity:  15 tablet        Take 1 tablet (1 mg) by mouth every 8 hours as needed for anxiety or seizures   Refills:  0        rOPINIRole 0.5 MG tablet   Commonly known as:  REQUIP   Quantity:  90 tablet        TAKE ONE TABLET BY MOUTH AT BEDTIME   Refills:  3                Prescriptions were sent or printed at these locations (3 Prescriptions)                   [facility] [address] [place] [address] [address]    [address] [facility] [address]    Telephone:  [phone]   Fax:  [phone]   Hours:                  E-Prescribed (3 of 3)         albuterol (PROAIR HFA/PROVENTIL HFA/VENTOLIN HFA) 108 (90 Base) MCG/ACT inhaler               azithromycin (ZITHROMAX Z-SHIRA) 250 MG tablet               predniSONE (DELTASONE) 20 MG tablet                Procedures and tests performed during your visit     Rapid strep group A screen POCT    XR Chest 2 Views      Orders Needing Specimen Collection     Ordered          12/05/18 1917  Beta strep group A r/o culture - ROUTINE, Prio: Routine, Status: Sent     Scheduled Task Status   12/05/18 1918 [employer]  Reminder: Open   Order Class:  PCU Collect                  Pending Results     No orders found from 12/3/2018 to 12/6/2018.            Pending Culture Results     No orders found from 12/3/2018 to 12/6/2018.            Pending Results Instructions     If you had any lab results that were not finalized at the time of your  "Discharge, you can call the ED Lab Result RN at [phone]. You will be contacted by this team for any positive Lab results or changes in treatment. The nurses are available 7 days a week from 10A to 6:30P.  You can leave a message 24 hours per day and they will return your call.        Test Results From Your Hospital Stay        2018  7:17 PM      Component Results     Component Value Ref Range & Units Status    Rapid Strep A Screen negative neg Final    Internal QC OK Yes  Final         2018  7:39 PM      Narrative     CHEST TWO VIEWS  2018 7:36 PM     HISTORY: Cough.    COMPARISON: None.        Impression     IMPRESSION: Normal.    SONAM ALEXIS MD                Thank you for choosing [facility]       Thank you for choosing [facility] for your care. Our goal is always to provide you with excellent care. Hearing back from our patients is one way we can continue to improve our services. Please take a few minutes to complete the written survey that you may receive in the mail after you visit with us. Thank you!        [employer] Information     [employer] lets you send messages to your doctor, view your test results, renew your prescriptions, schedule appointments and more. To sign up, go to [email or web address] . Click on \"Log in\" on the left side of the screen, which will take you to the Welcome page. Then click on \"Sign up Now\" on the right side of the page.     You will be asked to enter the access code listed below, as well as some personal information. Please follow the directions to create your username and password.     Your access code is: [email or web address]  Expires: 3/5/2019  7:58 PM     Your access code will  in 90 days. If you need help or a new code, please call your [facility] clinic or [phone].        Care EveryWhere ID     This is your Care EveryWhere ID. This could be used by other organizations to access your [facility] medical records  [id number]        Equal Access to Services     " EARL BROWN : Hadii reji Fernandez, waaxda luqadaha, qaybta kaalmada julian, rosa mehta. So [facility] [phone].    ATENCIÓN: Si habla español, tiene a manzano disposición servicios gratuitos de asistencia lingüística. Llame al [phone].    We comply with applicable federal civil rights laws and [place] laws. We do not discriminate on the basis of race, color, national origin, age, disability, sex, sexual orientation, or gender identity.            After Visit Summary       This is your record. Keep this with you and show to your community pharmacist(s) and doctor(s) at your next visit.

## 2018-12-06 NOTE — ED PROVIDER NOTES
History   No chief complaint on file.    HPI  Tita Prieto is a 23 year old female who resents to urgent care for evaluation of cough, nasal congestion, and sore throat.  Symptoms started 2 weeks ago.  Sore throat worse in the last 2 days.  No objective fever, but endorses chills.  Cough is productive.  Patient exposed to children, works in a  setting, many ill with respiratory symptoms.    Problem List:    Patient Active Problem List    Diagnosis Date Noted     Morbid obesity with BMI of 40.0-44.9, adult (H) 2017     Priority: Medium     Irritable bowel syndrome with diarrhea 2017     Priority: Medium     Overview:   2017: Fiber       Guillain Barré syndrome (H) 2016     Priority: Medium     Overview:   ?? 2015 [facility] in [place] hospital visit. Avoiding flu vaccines.       Hand weakness 2015     Priority: Medium     Contraception 2015     Priority: Medium     Overview:   Nexplanon placed 2015       Mixed hyperlipidemia 2014     Priority: Medium     Overview:     Last Lipids:  Last Lipids:  Chol: 2016 207   101  HDL: 2016 49   LDL: 2016 138  LDL DIRECT: . No results found in past 5 years       Restless legs syndrome (RLS) 2013     Priority: Medium     Overview:   : Started on Requip  She is on the 0.25mg at bedtime/night and is able to get 6 hours of sleep.       Seizure disorder (H) 2013     Priority: Medium     Overview:   Carbamezapine 300mg am and 600mg pm.   Sees neurologist  Last seizure episode- 10/2015          Past Medical History:    Past Medical History:   Diagnosis Date     Seizures (H)        Past Surgical History:    Past Surgical History:   Procedure Laterality Date     SURGICAL HISTORY OF -   1994    PE tubes       Family History:    No family history on file.    Social History:  Marital Status:   [2]  Social History   Substance Use Topics     Smoking status: Never Smoker      Smokeless tobacco: Never Used     Alcohol use No        Medications:      albuterol (PROAIR HFA/PROVENTIL HFA/VENTOLIN HFA) 108 (90 Base) MCG/ACT inhaler   azithromycin (ZITHROMAX Z-SHIRA) 250 MG tablet   predniSONE (DELTASONE) 20 MG tablet   carBAMazepine (CARBATROL) 300 MG 12 hr capsule   folic acid (FOLVITE) 1 MG tablet   LORazepam (ATIVAN) 1 MG tablet   rOPINIRole (REQUIP) 0.5 MG tablet         Review of Systems   Constitutional: Positive for chills and fatigue. Negative for fever.   HENT: Positive for congestion and sore throat. Negative for ear pain.    Respiratory: Positive for cough. Negative for shortness of breath.    Cardiovascular: Negative for chest pain.   Gastrointestinal: Negative for vomiting.       Physical Exam   BP: 133/78  Pulse: 107  Temp: 98.8  F (37.1  C)  Resp: 20  SpO2: 99 %      Physical Exam  GENERAL APPEARANCE: healthy, alert and no distress  EYES: EOMI,  PERRL, conjunctiva clear  HENT: ear canals and TM's normal.  Nose normal.  Posterior oropharynx erythema without exudate.  Uvula is midline.  No unilateral peritonsillar swelling.  NECK: supple, nontender, no lymphadenopathy  RESP: lungs clear to auscultation - no rales, rhonchi or wheezes  CV: regular rates and rhythm, normal S1 S2, no murmur noted    ED Course     ED Course     Procedures             Results for orders placed or performed during the hospital encounter of 12/05/18 (from the past 24 hour(s))   Rapid strep group A screen POCT   Result Value Ref Range    Rapid Strep A Screen negative neg    Internal QC OK Yes    XR Chest 2 Views    Narrative    CHEST TWO VIEWS  12/5/2018 7:36 PM     HISTORY: Cough.    COMPARISON: None.      Impression    IMPRESSION: Normal.    SONAM ALEXIS MD       Medications - No data to display    Assessments & Plan (with Medical Decision Making)   23-year-old female with 2 weeks of upper respiratory congestion and cough.  Symptoms have been progressively worsening, no improvement.  Chills, but no  fever.  Sore throat for the last 2 days.  Cough is productive, and persistent.  On exam patient is afebrile.  Mild tachycardia noted.  Lung sounds with expiratory wheezing throughout, and rhonchi in bilateral bases.  Patient is a frequent cough during exam.  Chest x-ray is negative for consolidation or infiltrate.  Rapid strep is negative.  I have strong suspicion that patient has acute bronchitis illness.  Given her length of illness, exam, and failure to improve in the last 2 weeks patient be treated for bacterial pathogen.  Prescription for Z-Shira was provided.  Patient is not able to take doxycycline due to she is on carbamazepine.  Patient given prescription for albuterol inhaler and prednisone due to her wheezing.  Patient instructed to have a low threshold for returning if she develops chest pain, fever, shortness of breath, vomiting, or worse in any way.      I have reviewed the nursing notes.    I have reviewed the findings, diagnosis, plan and need for follow up with the patient.      New Prescriptions    ALBUTEROL (PROAIR HFA/PROVENTIL HFA/VENTOLIN HFA) 108 (90 BASE) MCG/ACT INHALER    Inhale 2 puffs into the lungs every 6 hours as needed for shortness of breath / dyspnea or wheezing    AZITHROMYCIN (ZITHROMAX Z-SHIRA) 250 MG TABLET    Two tablets on the first day, then one tablet daily for the next 4 days    PREDNISONE (DELTASONE) 20 MG TABLET    Take two tablets (= 40mg) each day for 5 (five) days       Final diagnoses:   Acute pharyngitis, unspecified etiology   Acute bronchitis with symptoms > 10 days   Wheezing       12/5/2018   [facility] EMERGENCY DEPARTMENT     Genesis Tyson APRN CNP  12/05/18 2006

## 2018-12-06 NOTE — DISCHARGE INSTRUCTIONS
Z-abigail.  Prednisone 40mg daily for 5 days.  Albuterol inhaler 2 puffs every 6 hours as needed for bronchospasm, wheezing.  Return to the emergency permit for fever, shortness of breath, chest pain, or worse in any way.    What Is Acute Bronchitis?  Acute bronchitis is when the airways in your lungs (bronchial tubes) become red and swollen (inflamed). It is usually caused by a viral infection. But it can also occur because of a bacteria or allergen. Symptoms include a cough that produces yellow or greenish mucus and can last for days or sometimes weeks.  Inside healthy lungs    Air travels in and out of the lungs through the airways. The linings of these airways produce sticky mucus. This mucus traps particles that enter the lungs. Tiny structures called cilia then sweep the particles out of the airways.     Healthy airway: Airways are normally open. Air moves in and out easily.      Healthy cilia: Tiny, hairlike cilia sweep mucus and particles up and out of the airways.     Lungs with bronchitis  Bronchitis often occurs with a cold or the flu virus. The airways become inflamed (red and swollen). There is a deep hacking cough from the extra mucus. Other symptoms may include:    Wheezing    Chest discomfort    Shortness of breath    Mild fever  A second infection, this time due to bacteria, may then occur. And airways irritated by allergens or smoke are more likely to get infected.        Inflamed airway: Inflammation and extra mucus narrow the airway, causing shortness of breath.      Impaired cilia: Extra mucus impairs cilia, causing congestion and wheezing. Smoking makes the problem worse.     Making a diagnosis  A physical exam, health history, and certain tests help your healthcare provider make the diagnosis.  Health history  Your healthcare provider will ask you about your symptoms.  The exam  Your provider listens to your chest for signs of congestion. He or she may also check your ears, nose, and  throat.  Possible tests    A sputum test for bacteria. This requires a sample of mucus from your lungs.    A nasal or throat swab. This tests to see if you have a bacterial infection.    A chest X-ray. This is done if your healthcare provider thinks you have pneumonia.    Tests to check for an underlying condition. Other tests may be done to check for things such as allergies, asthma, or COPD (chronic obstructive pulmonary disease). You may need to see a specialist for more lung function testing.  Treating a cough  The main treatment for bronchitis is easing symptoms. Avoiding smoke, allergens, and other things that trigger coughing can often help. If the infection is bacterial, you may be given antibiotics. During the illness, it's important to get plenty of sleep. To ease symptoms:    Don t smoke. Also avoid secondhand smoke.    Use a humidifier. Or try breathing in steam from a hot shower. This may help loosen mucus.    Drink a lot of water and juice. They can soothe the throat and may help thin mucus.    Sit up or use extra pillows when in bed. This helps to lessen coughing and congestion.    Ask your provider about using medicine. Ask about using cough medicine, pain and fever medicine, or a decongestant.  Antibiotics  Most cases of bronchitis are caused by cold or flu viruses. They don t need antibiotics to treat them, even if your mucus is thick and green or yellow. Antibiotics don t treat viral illness and antibiotics have not been shown to have any benefit in cases of acute bronchitis. Taking antibiotics when they are not needed increases your risk of getting an infection later that is antibiotic-resistant. Antibiotics can also cause severe cases of diarrhea that require other antibiotics to treat.  It is important that you accept your healthcare provider's opinion to not use antibiotics. Your provider will prescribe antibiotics if the infection is caused by bacteria. If they are prescribed:    Take all of  the medicine. Take the medicine until it is used up, even if symptoms have improved. If you don t, the bronchitis may come back.    Take the medicines as directed. For instance, some medicines should be taken with food.    Ask about side effects. Ask your provider or pharmacist what side effects are common, and what to do about them.  Follow-up care  You should see your provider again in 2 to 3 weeks. By this time, symptoms should have improved. An infection that lasts longer may mean you have a more serious problem.  Prevention    Avoid tobacco smoke. If you smoke, quit. Stay away from smoky places. Ask friends and family not to smoke around you, or in your home or car.    Get checked for allergies.    Ask your provider about getting a yearly flu shot. Also ask about pneumococcal or pneumonia shots.    Wash your hands often. This helps reduce the chance of picking up viruses that cause colds and flu.  Call your healthcare provider if:    Symptoms worsen, or you have new symptoms    Breathing problems worsen or  become severe    Symptoms don t get better within a week, or within 3 days of taking antibiotics   Date Last Reviewed: 2/1/2017 2000-2018 [employer]. [address] [place] [address]. All rights reserved. This information is not intended as a substitute for professional medical care. Always follow your healthcare professional's instructions.

## 2018-12-07 LAB
BACTERIA SPEC CULT: NORMAL
Lab: NORMAL
SPECIMEN SOURCE: NORMAL

## 2018-12-10 ENCOUNTER — HOSPITAL ENCOUNTER (OUTPATIENT)
Facility: CLINIC | Age: 23
Setting detail: OBSERVATION
Discharge: HOME | End: 2018-12-10
Attending: EMERGENCY MEDICINE | Admitting: INTERNAL MEDICINE
Payer: COMMERCIAL

## 2018-12-10 VITALS
HEIGHT: 68 IN | RESPIRATION RATE: 18 BRPM | TEMPERATURE: 98.2 F | BODY MASS INDEX: 37.46 KG/M2 | DIASTOLIC BLOOD PRESSURE: 46 MMHG | WEIGHT: 247.14 LBS | SYSTOLIC BLOOD PRESSURE: 108 MMHG | HEART RATE: 78 BPM | OXYGEN SATURATION: 97 %

## 2018-12-10 DIAGNOSIS — R56.9 SEIZURE (H): ICD-10-CM

## 2018-12-10 DIAGNOSIS — G40.909 SEIZURE DISORDER (H): Primary | ICD-10-CM

## 2018-12-10 DIAGNOSIS — R56.9 SEIZURES (H): ICD-10-CM

## 2018-12-10 DIAGNOSIS — Z79.899 NEED FOR PROPHYLACTIC CHEMOTHERAPY: ICD-10-CM

## 2018-12-10 DIAGNOSIS — G40.909 NONINTRACTABLE EPILEPSY WITHOUT STATUS EPILEPTICUS, UNSPECIFIED EPILEPSY TYPE (H): ICD-10-CM

## 2018-12-10 LAB
ANION GAP SERPL CALCULATED.3IONS-SCNC: 8 MMOL/L (ref 3–14)
BASOPHILS # BLD AUTO: 0 10E9/L (ref 0–0.2)
BASOPHILS NFR BLD AUTO: 0.4 %
BUN SERPL-MCNC: 17 MG/DL (ref 7–30)
CALCIUM SERPL-MCNC: 8.3 MG/DL (ref 8.5–10.1)
CARBAMAZEPINE SERPL-MCNC: 11.2 MG/L (ref 4–12)
CHLORIDE SERPL-SCNC: 106 MMOL/L (ref 94–109)
CO2 SERPL-SCNC: 27 MMOL/L (ref 20–32)
CREAT SERPL-MCNC: 0.57 MG/DL (ref 0.52–1.04)
DEPRECATED S PYO AG THROAT QL EIA: NORMAL
DIFFERENTIAL METHOD BLD: NORMAL
EOSINOPHIL # BLD AUTO: 0.1 10E9/L (ref 0–0.7)
EOSINOPHIL NFR BLD AUTO: 0.6 %
ERYTHROCYTE [DISTWIDTH] IN BLOOD BY AUTOMATED COUNT: 12.5 % (ref 10–15)
GFR SERPL CREATININE-BSD FRML MDRD: >90 ML/MIN/1.7M2
GLUCOSE SERPL-MCNC: 90 MG/DL (ref 70–99)
HCT VFR BLD AUTO: 37.9 % (ref 35–47)
HGB BLD-MCNC: 12.8 G/DL (ref 11.7–15.7)
IMM GRANULOCYTES # BLD: 0 10E9/L (ref 0–0.4)
IMM GRANULOCYTES NFR BLD: 0.4 %
LYMPHOCYTES # BLD AUTO: 4.1 10E9/L (ref 0.8–5.3)
LYMPHOCYTES NFR BLD AUTO: 37.6 %
MAGNESIUM SERPL-MCNC: 2.2 MG/DL (ref 1.6–2.3)
MCH RBC QN AUTO: 31.9 PG (ref 26.5–33)
MCHC RBC AUTO-ENTMCNC: 33.8 G/DL (ref 31.5–36.5)
MCV RBC AUTO: 95 FL (ref 78–100)
MONOCYTES # BLD AUTO: 0.5 10E9/L (ref 0–1.3)
MONOCYTES NFR BLD AUTO: 4.6 %
NEUTROPHILS # BLD AUTO: 6.1 10E9/L (ref 1.6–8.3)
NEUTROPHILS NFR BLD AUTO: 56.4 %
NRBC # BLD AUTO: 0 10*3/UL
NRBC BLD AUTO-RTO: 0 /100
PLATELET # BLD AUTO: 304 10E9/L (ref 150–450)
POTASSIUM SERPL-SCNC: 3.5 MMOL/L (ref 3.4–5.3)
RBC # BLD AUTO: 4.01 10E12/L (ref 3.8–5.2)
SODIUM SERPL-SCNC: 141 MMOL/L (ref 133–144)
SPECIMEN SOURCE: NORMAL
WBC # BLD AUTO: 10.8 10E9/L (ref 4–11)

## 2018-12-10 PROCEDURE — 80156 ASSAY CARBAMAZEPINE TOTAL: CPT | Performed by: EMERGENCY MEDICINE

## 2018-12-10 PROCEDURE — 80048 BASIC METABOLIC PNL TOTAL CA: CPT | Performed by: PHYSICIAN ASSISTANT

## 2018-12-10 PROCEDURE — 87880 STREP A ASSAY W/OPTIC: CPT | Performed by: PHYSICIAN ASSISTANT

## 2018-12-10 PROCEDURE — 25000132 ZZH RX MED GY IP 250 OP 250 PS 637: Performed by: PHYSICIAN ASSISTANT

## 2018-12-10 PROCEDURE — 99285 EMERGENCY DEPT VISIT HI MDM: CPT | Mod: Z6 | Performed by: EMERGENCY MEDICINE

## 2018-12-10 PROCEDURE — 85025 COMPLETE CBC W/AUTO DIFF WBC: CPT | Performed by: PHYSICIAN ASSISTANT

## 2018-12-10 PROCEDURE — 96365 THER/PROPH/DIAG IV INF INIT: CPT | Performed by: EMERGENCY MEDICINE

## 2018-12-10 PROCEDURE — 96375 TX/PRO/DX INJ NEW DRUG ADDON: CPT | Performed by: EMERGENCY MEDICINE

## 2018-12-10 PROCEDURE — 87081 CULTURE SCREEN ONLY: CPT | Performed by: INTERNAL MEDICINE

## 2018-12-10 PROCEDURE — 83735 ASSAY OF MAGNESIUM: CPT | Performed by: PHYSICIAN ASSISTANT

## 2018-12-10 PROCEDURE — 25000132 ZZH RX MED GY IP 250 OP 250 PS 637: Performed by: INTERNAL MEDICINE

## 2018-12-10 PROCEDURE — 99285 EMERGENCY DEPT VISIT HI MDM: CPT | Mod: 25 | Performed by: EMERGENCY MEDICINE

## 2018-12-10 PROCEDURE — 25000128 H RX IP 250 OP 636: Performed by: EMERGENCY MEDICINE

## 2018-12-10 PROCEDURE — 99207 ZZC CDG-CODE CATEGORY CHANGED: CPT | Performed by: PHYSICIAN ASSISTANT

## 2018-12-10 PROCEDURE — 99236 HOSP IP/OBS SAME DATE HI 85: CPT | Performed by: PHYSICIAN ASSISTANT

## 2018-12-10 PROCEDURE — G0378 HOSPITAL OBSERVATION PER HR: HCPCS

## 2018-12-10 RX ORDER — IBUPROFEN 600 MG/1
600 TABLET, FILM COATED ORAL EVERY 6 HOURS PRN
Status: DISCONTINUED | OUTPATIENT
Start: 2018-12-10 | End: 2018-12-10 | Stop reason: HOSPADM

## 2018-12-10 RX ORDER — CARBAMAZEPINE 300 MG/1
300 CAPSULE, EXTENDED RELEASE ORAL DAILY
Status: DISCONTINUED | OUTPATIENT
Start: 2018-12-10 | End: 2018-12-10 | Stop reason: CLARIF

## 2018-12-10 RX ORDER — NALOXONE HYDROCHLORIDE 0.4 MG/ML
.1-.4 INJECTION, SOLUTION INTRAMUSCULAR; INTRAVENOUS; SUBCUTANEOUS
Status: DISCONTINUED | OUTPATIENT
Start: 2018-12-10 | End: 2018-12-10 | Stop reason: HOSPADM

## 2018-12-10 RX ORDER — CARBAMAZEPINE 100 MG/1
300 TABLET, EXTENDED RELEASE ORAL DAILY
Status: DISCONTINUED | OUTPATIENT
Start: 2018-12-10 | End: 2018-12-10 | Stop reason: HOSPADM

## 2018-12-10 RX ORDER — ONDANSETRON 4 MG/1
4 TABLET, ORALLY DISINTEGRATING ORAL EVERY 6 HOURS PRN
Status: DISCONTINUED | OUTPATIENT
Start: 2018-12-10 | End: 2018-12-10 | Stop reason: HOSPADM

## 2018-12-10 RX ORDER — ACETAMINOPHEN 325 MG/1
650 TABLET ORAL EVERY 4 HOURS PRN
Status: DISCONTINUED | OUTPATIENT
Start: 2018-12-10 | End: 2018-12-10 | Stop reason: HOSPADM

## 2018-12-10 RX ORDER — CARBAMAZEPINE 300 MG/1
600 CAPSULE, EXTENDED RELEASE ORAL
Status: DISCONTINUED | OUTPATIENT
Start: 2018-12-10 | End: 2018-12-10 | Stop reason: CLARIF

## 2018-12-10 RX ORDER — ONDANSETRON 2 MG/ML
4 INJECTION INTRAMUSCULAR; INTRAVENOUS EVERY 6 HOURS PRN
Status: DISCONTINUED | OUTPATIENT
Start: 2018-12-10 | End: 2018-12-10 | Stop reason: HOSPADM

## 2018-12-10 RX ORDER — LORAZEPAM 2 MG/ML
1 INJECTION INTRAMUSCULAR ONCE
Status: COMPLETED | OUTPATIENT
Start: 2018-12-10 | End: 2018-12-10

## 2018-12-10 RX ORDER — ROPINIROLE 0.25 MG/1
0.5 TABLET, FILM COATED ORAL AT BEDTIME
Status: DISCONTINUED | OUTPATIENT
Start: 2018-12-10 | End: 2018-12-10 | Stop reason: HOSPADM

## 2018-12-10 RX ORDER — CARBAMAZEPINE 200 MG/1
600 TABLET ORAL
Status: DISCONTINUED | OUTPATIENT
Start: 2018-12-10 | End: 2018-12-10

## 2018-12-10 RX ORDER — FOLIC ACID 1 MG/1
2 TABLET ORAL DAILY
Status: DISCONTINUED | OUTPATIENT
Start: 2018-12-10 | End: 2018-12-10 | Stop reason: HOSPADM

## 2018-12-10 RX ORDER — LEVETIRACETAM 750 MG/1
750 TABLET ORAL 2 TIMES DAILY
Qty: 60 TABLET | Refills: 1 | Status: SHIPPED | OUTPATIENT
Start: 2018-12-10 | End: 2019-11-14

## 2018-12-10 RX ORDER — CARBAMAZEPINE 200 MG/1
600 TABLET, EXTENDED RELEASE ORAL EVERY EVENING
Status: DISCONTINUED | OUTPATIENT
Start: 2018-12-10 | End: 2018-12-10 | Stop reason: HOSPADM

## 2018-12-10 RX ORDER — AZITHROMYCIN 250 MG/1
250 TABLET, FILM COATED ORAL ONCE
Status: COMPLETED | OUTPATIENT
Start: 2018-12-10 | End: 2018-12-10

## 2018-12-10 RX ADMIN — LEVETIRACETAM 2000 MG: 500 INJECTION, SOLUTION INTRAVENOUS at 04:51

## 2018-12-10 RX ADMIN — CARBAMAZEPINE 600 MG: 200 TABLET, EXTENDED RELEASE ORAL at 17:04

## 2018-12-10 RX ADMIN — AZITHROMYCIN 250 MG: 250 TABLET, FILM COATED ORAL at 08:47

## 2018-12-10 RX ADMIN — FOLIC ACID 2 MG: 1 TABLET ORAL at 07:53

## 2018-12-10 RX ADMIN — LORAZEPAM 1 MG: 2 INJECTION INTRAMUSCULAR; INTRAVENOUS at 04:00

## 2018-12-10 ASSESSMENT — ENCOUNTER SYMPTOMS
NUMBNESS: 0
MYALGIAS: 0
COUGH: 1
DIARRHEA: 0
HEADACHES: 0
CONFUSION: 0
SHORTNESS OF BREATH: 0
CHILLS: 0
ACTIVITY CHANGE: 0
DIZZINESS: 0
VOMITING: 0
WEAKNESS: 0
FATIGUE: 0
NAUSEA: 0
APPETITE CHANGE: 0
DYSURIA: 0
ABDOMINAL PAIN: 0
BACK PAIN: 0
SEIZURES: 1

## 2018-12-10 ASSESSMENT — MIFFLIN-ST. JEOR: SCORE: 1914.5

## 2018-12-10 NOTE — PLAN OF CARE
Seizure pads intact. Patient has denied seizure activity today. No seizure activity noted. Her mother has been in the room most of today. Eating and drinking ok.

## 2018-12-10 NOTE — DISCHARGE SUMMARY
[facility]    Discharge Summary  Hospital Medicine    Date of Admission:  12/10/2018  Date of Discharge:  12/10/2018   Discharging Provider: Kandi Boo  Date of Service: 12/10/2018     Primary Care     Cherie Garcia [address] [address]      Identification and Chief Compaint: Tita Prieto is a 23 year old female who presented on 12/10/2018 with complaint of 2 breakthrough seizures.    Discharge Diagnoses       Seizure     Restless legs syndrome (RLS)    Seizure disorder    Discharge Disposition   Discharged to home    Discharge Orders      Reason for your hospital stay    You were seen for seizures. You were started on a new antiepileptic medication, Keppra, which you should take twice a day.     Follow-up and recommended labs and tests     Follow up with primary care provider, Cherie Garcia, within 7 days for hospital follow- up.  The following labs/tests are recommended: Keppra level.    Follow up with neurologist, within next 2 weeks for hospital follow up.     Activity    Your activity upon discharge: activity as tolerated and no driving for 6 months     Diet    Follow this diet upon discharge: Regular Diet Adult     Discharge Medications   Current Discharge Medication List      START taking these medications    Details   levETIRAcetam (KEPPRA) 750 MG tablet Take 1 tablet (750 mg) by mouth 2 times daily  Qty: 60 tablet, Refills: 1    Associated Diagnoses: Seizure disorder (H); Seizure (H)         CONTINUE these medications which have NOT CHANGED    Details   albuterol (PROAIR HFA/PROVENTIL HFA/VENTOLIN HFA) 108 (90 Base) MCG/ACT inhaler Inhale 2 puffs into the lungs every 6 hours as needed for shortness of breath / dyspnea or wheezing  Qty: 1 Inhaler, Refills: 0      carBAMazepine (CARBATROL) 300 MG 12 hr capsule TAKE ONE CAPSULE BY MOUTH DAILY IN THE MORNING AND TWO CAPSULES DAILY IN THE EVENING  Qty: 90  "capsule, Refills: 11    Associated Diagnoses: Seizure disorder (H)      folic acid (FOLVITE) 1 MG tablet Take 2 tablets (2 mg) by mouth daily  Qty: 60 tablet, Refills: 11    Associated Diagnoses: Seizure disorder (H)      rOPINIRole (REQUIP) 0.5 MG tablet TAKE ONE TABLET BY MOUTH AT BEDTIME  Qty: 90 tablet, Refills: 3    Comments: Please consider 90 day supplies to promote better adherence  Associated Diagnoses: Restless leg syndrome         STOP taking these medications       azithromycin (ZITHROMAX Z-SHIRA) 250 MG tablet Comments:   Reason for Stopping:         predniSONE (DELTASONE) 20 MG tablet Comments:   Reason for Stopping:             Allergies   No Known Allergies    Consultations This Hospital Stay   No consultations were requested during this admission    None    Significant Results and Procedures   Procedures    None    Data   Results for orders placed or performed during the hospital encounter of 12/05/18   XR Chest 2 Views    Narrative    CHEST TWO VIEWS  12/5/2018 7:36 PM     HISTORY: Cough.    COMPARISON: None.      Impression    IMPRESSION: Normal.    SONAM ALEXIS MD     History of Present Illness   (From H&P) \"Tita Prieto is a 23 year old female who presents after seizures.     This morning around 2:30 AM she had 2 seizures at home, lasting 30 seconds to a minute, 30 minutes a part. Her seizures were consistent with her typical home seizure with right sided seizure with predominantly her right leg shaking. Her last seizure over a year ago. She has not missed any doses of her medications. Currently feeling a tingling discomfort in her right leg which is her typical aura which she typically feels before a seizure. She currently feels a little dizzy and lightheaded but otherwise fine.      She came in to the ED 12/5/2018 and was diagnosed with bronchitis (shortness of breath, wheezing, sore throat and cough) and was put on azithromycin and prednisone and albuterol. She has one more dose remaining " "of these medications though feels improved from this standpoint.      Patient denies palpitations, chest pain, abdominal pain, nausea, vomiting, changes in bowel movements, changes in urination (dysuria, changes in frequency/urgency), rash or wounds.\"    Hospital Course   Ttia Prieto was admitted on 12/10/2018.  The following problems were addressed during her hospitalization:    Seizures with history of seizure disorder  2 focal seizures this morning, consistent with her typical focal seizures with right leg shaking, no LOC, lasting about 30 seconds to a minute. Currently feels aura-like symptoms in her right leg, otherwise no complaints. Neurological exam non-focal. Vital signs stable. Labs pending. ED spoke with Neurology who recommended starting Keppra 750 mg BID and a period of observation with no further seizure activity. Carbamazepine level therapeutic at 11.2 on presentation. She received 2000 mg IV Keppra on presentation to ED this morning. Breakthrough seizure may be related to current URI as patient has had breakthrough seizure related to illness in the past. Currently on azithromycin and prednisone for this and feeling improved. Oropharynx appears erythematous and mildly inflamed, rapid strep negative. Culture pending. Labs unremarkable.   Follows with neurology, last seen by Dr. Otero 2/2018 in the [facility], last visit reviewed. Typical seizure involves right leg jerking without LOC or amnesia. Managed with carbamazepine 300 mg in the morning and 600 mg in the evening.   Patient without any signs of seizure activity after 12 hour period of observation. Plan for discharge home with addition of Keppra with follow up with neurology and checking of Keppra in one week.  - continue home carbamazepine 300 mg in the morning, 600 mg in the evening  - start keppra 750 mg BID, starting this evening as already received loading dose in ED  - recommend checking Keppra level in one week  - follow up with " neurology as outpatient     Recently treated URI  Patient recently treated with azithromycin and prednisone for URI symptoms (cough, shortness of breath, wheeze). Currently symptoms are much improved though on exam oropharynx appears mildly erythematous with possible mild swelling of tonsils. No exudate. Lungs clear Afebrile. Patient has been on prednisone.   - pending strep culture     Restless legs syndrome (RLS)  Managed on Requip at bedtime.  - continue home Requip    Pending Results   Unresulted Labs Ordered in the Past 30 Days of this Admission     Date and Time Order Name Status Description    12/10/2018 0755 Beta strep group A culture Preliminary         Physical Exam   Temp:  [97.8  F (36.6  C)-99  F (37.2  C)] 98.2  F (36.8  C)  Pulse:  [78-90] 78  Resp:  [18-20] 18  BP: (106-158)/(46-94) 108/46  SpO2:  [95 %-100 %] 97 %  Vitals:    12/10/18 0617   Weight: 112.1 kg (247 lb 2.2 oz)     Constitutional: Alert, oriented, cooperative, no apparent distress, appears nontoxic, Appears stated age.  Eyes: Sclera are anicteric, EOMI, PEERL  HENT: Normocephalic. Atraumatic. Oropharynx is clear and moist.   Lymph/Hematologic: No epitrochlear, axillary, preauricular, postauricular, occipital, sub-mandibular, tonsillar, sub-mental, anterior or posterior cervical, or supraclavicular lymphadenopathy is appreciated.  Cardiovascular: Regular rate and normal rhythm. Radial pulses are 2+ bilaterally. Distal pulses are intact. No lower extremity edema.  Respiratory: No accessory muscle usage. Speaking in full sentences. Clear to auscultation bilaterally without wheezes, crackles or rhonchi.   GI:Normal bowel sounds present, soft, non-tender,non-distended. No rebound or guarding.   Genitourinary: Deferred  Musculoskeletal: Normal muscle bulk and tone. Moves all extremities appropriately.  Skin: Warm and dry, no rashes.   Neurologic: Neck supple. Cranial nerves 3-12 are grossly intact.  is symmetric.     The discharge plan  was discussed with the patient.     Total time on this discharge was > 30 minutes.    I have discussed patient and formulated plan with Dr. Donavan Arnold.    Kandi Boo, [facility]

## 2018-12-10 NOTE — ED PROVIDER NOTES
History     Chief Complaint   Patient presents with     Seizures     per patient states she had 2 seizures starting at 0230     HPI  Tita Prieto is a 23 year old female with a history of seizure disorder clinic for evaluation of 2 episodes of seizure tonight.  She has a history of long-standing focal seizures for which she has been well controlled on carbamazepine.  She currently is on 300 mg in the morning and 600 mg at night.  Unfortunately, she has had some upper respiratory symptoms recently and was diagnosed with acute bronchitis. She was started on azithromycin and prednisone beginning  for this.  Overall she reports feeling better from her cough and congestion standpoint however tonight had 2 episodes of seizure.  She reports her seizures are her normal episodes of leg shaking, she had her normal preceding aura and postictal confusion.  Otherwise she has been feeling well.  Denies fever or chills.  Denies abdominal pain, nausea, vomiting.  No headache.  Denies missing any doses of her carbamazepine.    Problem List:    Patient Active Problem List    Diagnosis Date Noted     Seizure (H) 12/10/2018     Priority: Medium     Morbid obesity with BMI of 40.0-44.9, adult (H) 2017     Priority: Medium     Irritable bowel syndrome with diarrhea 2017     Priority: Medium     Overview:   2017: Fiber       Guillain Barré syndrome (H) 2016     Priority: Medium     Overview:   ?? 2015 [facility] [place] hospital visit. Avoiding flu vaccines.       Hand weakness 2015     Priority: Medium     Contraception 2015     Priority: Medium     Overview:   Nexplanon placed 2015       Mixed hyperlipidemia 2014     Priority: Medium     Overview:     Last Lipids:  Last Lipids:  Chol: 2016 207   101  HDL: 2016 49   LDL: 2016 138  LDL DIRECT: . No results found in past 5 years       Restless legs syndrome (RLS) 2013     Priority:  Medium     Overview:   7/13: Started on Requip  She is on the 0.25mg at bedtime/night and is able to get 6 hours of sleep.       Seizure disorder (H) 03/08/2013     Priority: Medium     Overview:   Carbamezapine 300mg am and 600mg pm.   Sees neurologist  Last seizure episode- 10/2015          Past Medical History:    Past Medical History:   Diagnosis Date     Seizures (H)        Past Surgical History:    Past Surgical History:   Procedure Laterality Date     SURGICAL HISTORY OF -   06/03/1994    PE tubes       Family History:    No family history on file.    Social History:  Marital Status:   [2]  Social History     Tobacco Use     Smoking status: Never Smoker     Smokeless tobacco: Never Used   Substance Use Topics     Alcohol use: No     Drug use: No        Medications:      albuterol (PROAIR HFA/PROVENTIL HFA/VENTOLIN HFA) 108 (90 Base) MCG/ACT inhaler   azithromycin (ZITHROMAX Z-SHIRA) 250 MG tablet   carBAMazepine (CARBATROL) 300 MG 12 hr capsule   folic acid (FOLVITE) 1 MG tablet   predniSONE (DELTASONE) 20 MG tablet   rOPINIRole (REQUIP) 0.5 MG tablet   LORazepam (ATIVAN) 1 MG tablet         Review of Systems   Constitutional: Negative for activity change, appetite change, chills and fatigue.   HENT: Positive for congestion (improving).    Respiratory: Positive for cough (improving). Negative for shortness of breath.    Cardiovascular: Negative for chest pain.   Gastrointestinal: Negative for abdominal pain, diarrhea, nausea and vomiting.   Genitourinary: Negative for dysuria and urgency.   Musculoskeletal: Negative for back pain and myalgias.   Skin: Negative for rash.   Neurological: Positive for seizures. Negative for dizziness, weakness, numbness and headaches.   Psychiatric/Behavioral: Negative for confusion.       Physical Exam   BP: (!) 158/94  Pulse: 85  Temp: 97.8  F (36.6  C)  Resp: 20  SpO2: 100 %      Physical Exam   Constitutional: She is oriented to person, place, and time. She appears  well-developed and well-nourished. No distress.   HENT:   Head: Normocephalic and atraumatic.   Mouth/Throat: Oropharynx is clear and moist.   Eyes: Pupils are equal, round, and reactive to light.   Cardiovascular: Normal rate and regular rhythm.   Pulmonary/Chest: Effort normal and breath sounds normal.   Abdominal: Soft. There is no tenderness.   obese   Musculoskeletal: Normal range of motion.   Neurological: She is alert and oriented to person, place, and time.   Skin: Skin is warm and dry. Capillary refill takes less than 2 seconds.   Psychiatric: She has a normal mood and affect.   Nursing note and vitals reviewed.      ED Course        Procedures                   Results for orders placed or performed during the hospital encounter of 12/10/18 (from the past 24 hour(s))   Carbamazepine total   Result Value Ref Range    Carbamazepine Level 11.2 4.0 - 12.0 mg/L       Medications   acetaminophen (TYLENOL) tablet 650 mg (not administered)   ibuprofen (ADVIL/MOTRIN) tablet 600 mg (not administered)   LORazepam (ATIVAN) injection 1 mg (1 mg Intravenous Given 12/10/18 0400)   levETIRAcetam (KEPPRA) 2,000 mg in sodium chloride 0.9 % 100 mL intermittent infusion (2,000 mg Intravenous New Bag 12/10/18 0451)     4:27 AM: PT re-assessed. Feeling better, back to normal. Paged neuro to discuss any other recommendations.     4:33 AM:  [facility] Camarillo[facility] Discussed case. Recommends considering keppra 2000 mg once, 750mg bid. Recommends 12hr obs.     5:29 AM: Discussed with Dr Sherman. Accepts for obs admission.    Assessments & Plan (with Medical Decision Making)  23-year-old female with long-standing seizure disorder presenting for evaluation of 2 seizures tonight.  She has had a cold recently for which she is on azithromycin and prednisone.  Her cold symptoms have overall improved however she had to recurrent seizures tonight.  Her symptoms were normal with a normal preceding aura and  subsequent leg twitching.  No atypical symptoms of her seizure.  Has not had a seizure for a year or more.  Reports she is taking all of her daily prescribed carbamazepine.  Still feeling in the ED like she may have a seizure so was given lorazepam prophylactically.  Carbamazepine level drawn and was appropriately therapeutic at 11.2.  Case discussed with neurology who recommended loading with Keppra and a period of observation to assure no further seizure activity. Admitted to the hospitalist service.      I have reviewed the nursing notes.    I have reviewed the findings, diagnosis, plan and need for follow up with the patient.       Current Discharge Medication List          Final diagnoses:   Seizures (H)       12/10/2018   [facility] EMERGENCY DEPARTMENT     Ferrer, Td Corea MD  12/10/18 0583

## 2018-12-10 NOTE — DISCHARGE INSTRUCTIONS
1. Start Keppra this evening, take twice a day.   2. Please follow up with your primary care provider in 1 week to get your Keppra level checked.  3. Please follow up with your neurologist as soon as you are able to get in for hospital follow up and evaluation of the medication change.

## 2018-12-10 NOTE — ED NOTES
"Patient has  [place] to Observation  order. Patient has been given the Observation brochure -  What does Observation mean to me.\"  Patient has been given the opportunity to ask questions about observation status and their plan of care.      RADHA ARAIZA    "

## 2018-12-10 NOTE — H&P
[facility]    History and Physical  Hospital Medicine       Date of Admission:  12/10/2018  Date of Service: 12/10/2018     Assessment & Plan   Tita Prieto is a 23 year old female with past medical history of seizure disorder and restless leg syndrome who presents with 2 seizures.    Seizures with history of seizure disorder  2 focal seizures this morning, consistent with her typical focal seizures with right leg shaking, no LOC, lasting about 30 seconds to a minute. Currently feels aura-like symptoms in her right leg, otherwise no complaints. Neurological exam non-focal. Vital signs stable. Labs pending. ED spoke with Neurology who recommended starting Keppra 750 mg BID and a period of observation with no further seizure activity. Carbamazepine level therapeutic at 11.2 on presentation. She received 2000 mg IV Keppra on presentation to ED this morning. Breakthrough seizure may be related to current URI as patient has had breakthrough seizure related to illness in the past. Currently on azithromycin and prednisone for this and feeling improved. Oropharynx appears erythematous and mildly inflamed, will repeat strep testing. Will order basic labs to evaluate for electrolyte abnormalities.  Follows with neurology, last seen by Dr. Otero 2/2018 in the Epilepsy Clinic, last visit reviewed. Typical seizure involves right leg jerking without LOC or amnesia. Managed with carbamazepine 300 mg in the morning and 600 mg in the evening.   - seizure precautions  - order CBC, BMP, magnesium  - repeat strep test  - continue home carbamazepine 300 mg in the morning, 600 mg in the evening  - start keppra 750 mg BID, starting this evening as already received loading dose in ED  - recommend checking Keppra level in one week  - follow up with neurology as outpatient    Recently treated URI  Patient recently treated with azithromycin and prednisone for URI symptoms (cough, shortness of breath, wheeze).  Currently symptoms are much improved though on exam oropharynx appears mildly erythematous with possible mild swelling of tonsils. No exudate. Lungs clear Afebrile. Patient has been on prednisone.   - repeat strep test    Restless legs syndrome (RLS)  Managed on Requip at bedtime.  - continue home Requip    FEN:  - Not indiacted  - Will monitor electrolytes and replace as needed  - Regular adult diet    DVT Prophylaxis: Low Risk/Ambulatory with no VTE prophylaxis indicated  Code Status: Full Code, discussed directly with patient    Disposition: Anticipate discharge later today or tomorrow, once observed without any further seizure activity. Appropriate for observation level care    I have discussed patient and formulated plan with Dr. Donavan Arnold.    Kandi Boo PA-C  [facility]        Primary Care Physician   Cherie Garcia [phone]    History is obtained from the patient, ED notes and review of the EMR.    History of Present Illness  Tita Prieto is a 23 year old female who presents after seizures.    This morning around 2:30 AM she had 2 seizures at home, lasting 30 seconds to a minute, 30 minutes a part. Her seizures were consistent with her typical home seizure with right sided seizure with predominantly her right leg shaking. Her last seizure over a year ago. She has not missed any doses of her medications. Currently feeling a tingling discomfort in her right leg which is her typical aura which she typically feels before a seizure. She currently feels a little dizzy and lightheaded but otherwise fine.     She came in to the ED 12/5/2018 and was diagnosed with bronchitis (shortness of breath, wheezing, sore throat and cough) and was put on azithromycin and prednisone and albuterol. She has one more dose remaining of these medications though feels improved from this standpoint.     Patient denies palpitations, chest pain, abdominal pain, nausea, vomiting, changes in bowel movements,  changes in urination (dysuria, changes in frequency/urgency), rash or wounds.    Past Medical History    Past Medical History:   Diagnosis Date     Seizures (H)      Patient Active Problem List    Diagnosis Date Noted     Seizure (H) 12/10/2018     Priority: Medium     Morbid obesity with BMI of 40.0-44.9, adult (H) 2017     Priority: Medium     Irritable bowel syndrome with diarrhea 2017     Priority: Medium     Overview:   2017: Fiber       Guillain Barré syndrome (H) 2016     Priority: Medium     Overview:   ?? 2015 [facility] in [place] hospital visit. Avoiding flu vaccines.       Hand weakness 2015     Priority: Medium     Contraception 2015     Priority: Medium     Overview:   Nexplanon placed 2015       Mixed hyperlipidemia 2014     Priority: Medium     Overview:     Last Lipids:  Last Lipids:  Chol: 2016 207   101  HDL: 2016 49   LDL: 2016 138  LDL DIRECT: . No results found in past 5 years       Restless legs syndrome (RLS) 2013     Priority: Medium     Overview:   : Started on Requip  She is on the 0.25mg at bedtime/night and is able to get 6 hours of sleep.       Seizure disorder (H) 2013     Priority: Medium     Overview:   Carbamezapine 300mg am and 600mg pm.   Sees neurologist  Last seizure episode- 10/2015        Past Surgical History   Past Surgical History:   Procedure Laterality Date     SURGICAL HISTORY OF -   1994    PE tubes      Prior to Admission Medications   Prior to Admission Medications   Prescriptions Last Dose Informant Patient Reported? Taking?   albuterol (PROAIR HFA/PROVENTIL HFA/VENTOLIN HFA) 108 (90 Base) MCG/ACT inhaler 12/10/2018 at Unknown time  No Yes   Sig: Inhale 2 puffs into the lungs every 6 hours as needed for shortness of breath / dyspnea or wheezing   azithromycin (ZITHROMAX Z-SHIRA) 250 MG tablet 2018 at am  No Yes   Sig: Two tablets on the first day, then one  "tablet daily for the next 4 days   carBAMazepine (CARBATROL) 300 MG 12 hr capsule 12/9/2018 at Unknown time  No Yes   Sig: TAKE ONE CAPSULE BY MOUTH DAILY IN THE MORNING AND TWO CAPSULES DAILY IN THE EVENING   folic acid (FOLVITE) 1 MG tablet 12/9/2018 at Unknown time  No Yes   Sig: Take 2 tablets (2 mg) by mouth daily   predniSONE (DELTASONE) 20 MG tablet 12/9/2018 at Unknown time  No Yes   Sig: Take two tablets (= 40mg) each day for 5 (five) days   rOPINIRole (REQUIP) 0.5 MG tablet 12/9/2018 at Unknown time  No Yes   Sig: TAKE ONE TABLET BY MOUTH AT BEDTIME      Facility-Administered Medications: None     Allergies   No Known Allergies    Family History    Family History   Problem Relation Age of Onset     No Known Problems Mother      No Known Problems Father        Social History   Social History     Socioeconomic History     Marital status:      Spouse name: Not on file     Number of children: Not on file     Years of education: Not on file     Highest education level: Not on file   Social Needs     Financial resource strain: Not on file     Food insecurity - worry: Not on file     Food insecurity - inability: Not on file     Transportation needs - medical: Not on file     Transportation needs - non-medical: Not on file   Occupational History     Not on file   Tobacco Use     Smoking status: Never Smoker     Smokeless tobacco: Never Used   Substance and Sexual Activity     Alcohol use: No     Drug use: No     Sexual activity: Yes     Birth control/protection: Implant   Other Topics Concern     Parent/sibling w/ CABG, MI or angioplasty before 65F 55M? Not Asked   Social History Narrative    Patient lives in [place] in an apartment on her own. She works in a  facility with toddlers.     Review of Systems   The 10 point Review of Systems is negative other than noted in the HPI or here.     Physical Exam   /62   Pulse 90   Temp 99  F (37.2  C)   Resp 18   Ht 1.727 m (5' 8\")   Wt 112.1 " kg (247 lb 2.2 oz)   LMP 12/07/2018   SpO2 97%   BMI 37.58 kg/m       Weight: 247 lbs 2.17 oz Body mass index is 37.58 kg/m .     Constitutional: Patient is lying down comfortably on exam. Alert & oriented. Pleasant & cooperative. No apparent distress. Appears nontoxic. Appears stated age.  Eyes: Sclera are anicteric, EOMI, PEERL  HENT: Normocephalic. Atraumatic. Oropharynx is moist, tonsils appear mildly inflamed and erythematous without exudate.   Lymph/Hematologic: No epitrochlear, axillary, preauricular, postauricular, occipital, sub-mandibular, tonsillar, sub-mental, anterior or posterior cervical, or supraclavicular lymphadenopathy is appreciated.  Cardiovascular: Regular rate and normal rhythm. No murmur, rubs or gallops noted. Radial pulses are 2+ bilaterally. Distal pulses are intact. No lower extremity edema.  Respiratory: No accessory muscle usage. Speaking in full sentences. Clear to auscultation bilaterally without wheezes, crackles or rhonchi.  GI: Normal bowel sounds present, soft, non-tender, non-distended. No rebound or guarding.   Genitourinary: Deferred  Musculoskeletal: Normal muscle bulk and tone. Moves all extremities appropriately.  Skin: Warm and dry, no rashes.   Neurologic: Neck supple. Cranial nerves 3-12 are grossly intact.  is symmetric. Strength in bilateral upper and lower extremities is grossly intact. Sensation to light touch is grossly intact in bilateral upper and lower extremities.     Data   Data reviewed today:   No lab results found in last 7 days.    No results found for this or any previous visit (from the past 24 hour(s)).    I personally reviewed no images or EKG's today.    I have discussed patient and formulated plan with Dr. Donavan Arnold.    Chart documentation with keystrokes and/or Dragon voice recognition software. Although reviewed after completion, some word and grammatical error may remain.  Kandi Boo PABrinaAvalon Municipal Hospital

## 2018-12-11 NOTE — PLAN OF CARE
[facility] [facility] DISCHARGE NOTE    Patient had not received tegretol this am. Updated pharmacy and they suggested to give tegretol evening dose asap. Patient given evening dose. Updated Dr. Arnold and received ok to continue discharge. Patient had received a large dose of keppra this am. Patient and her mother were updated on this and ok with discharge. Patient discharged to home at 5:15 PM via wheel chair. Accompanied by mother and staff. Discharge instructions reviewed with patient, opportunity offered to ask questions. Prescriptions sent to patients preferred pharmacy. All belongings sent with patient.    Carolina Robles

## 2018-12-12 LAB
BACTERIA SPEC CULT: NORMAL
Lab: NORMAL
SPECIMEN SOURCE: NORMAL

## 2019-02-19 DIAGNOSIS — G25.81 RESTLESS LEG SYNDROME: ICD-10-CM

## 2019-03-04 DIAGNOSIS — G40.909 SEIZURE DISORDER (H): ICD-10-CM

## 2019-03-04 RX ORDER — CARBAMAZEPINE 300 MG/1
CAPSULE, EXTENDED RELEASE ORAL
Qty: 90 CAPSULE | Refills: 0 | Status: SHIPPED | OUTPATIENT
Start: 2019-03-04 | End: 2019-03-14

## 2019-03-04 NOTE — TELEPHONE ENCOUNTER
Medication request meets [place] Medication Refill Protocol - Seizure Medications (non-controlled) requirements for a 30 day ken period. Patient is scheduled to see Dr. Otero on 3/14/19.

## 2019-03-12 RX ORDER — ROPINIROLE 0.5 MG/1
TABLET, FILM COATED ORAL
Qty: 90 TABLET | Refills: 3 | Status: SHIPPED | OUTPATIENT
Start: 2019-03-12 | End: 2019-03-13

## 2019-03-13 DIAGNOSIS — G25.81 RESTLESS LEG SYNDROME: ICD-10-CM

## 2019-03-14 ENCOUNTER — OFFICE VISIT (OUTPATIENT)
Dept: NEUROLOGY | Facility: CLINIC | Age: 24
End: 2019-03-14

## 2019-03-14 VITALS
HEART RATE: 74 BPM | TEMPERATURE: 98.4 F | SYSTOLIC BLOOD PRESSURE: 144 MMHG | DIASTOLIC BLOOD PRESSURE: 90 MMHG | BODY MASS INDEX: 35.43 KG/M2 | WEIGHT: 233 LBS

## 2019-03-14 DIAGNOSIS — G40.909 SEIZURE DISORDER (H): ICD-10-CM

## 2019-03-14 RX ORDER — FOLIC ACID 1 MG/1
2 TABLET ORAL DAILY
Qty: 60 TABLET | Refills: 11 | Status: SHIPPED | OUTPATIENT
Start: 2019-03-14 | End: 2020-02-20

## 2019-03-14 RX ORDER — CARBAMAZEPINE 300 MG/1
CAPSULE, EXTENDED RELEASE ORAL
Qty: 90 CAPSULE | Refills: 11 | Status: SHIPPED | OUTPATIENT
Start: 2019-03-14 | End: 2020-02-20

## 2019-03-14 ASSESSMENT — PAIN SCALES - GENERAL: PAINLEVEL: NO PAIN (0)

## 2019-03-14 NOTE — PROGRESS NOTES
"  CC: Follow up for seizures.    HPI: Ms. Tita Evans returns for follow up. She is by herself in the clinic today. She is a 23-year-old, right-handed female with a history of seizures since 2000.  She had seen Dr. Vlila from [facility] in the past. Since the last visit,  she had two seizures within one day in Dec, 2018.  The seizures were her usual seizures with right leg jerking movement that lasted for 1-2 min each, no loss of consciousness or amnesia.  She denied missing any medications.  She was sleep deprived and was sick with URI for a few days around that time.  This was the same situation as her last breakthrough seizure in Jan 2018. She was sent to ED and her tegretol level was 11.2.  She was started on Keppra 750 mg bid.  She could not tolerate the keppra and stopped herself after 2 weeks.  She is currently taking Carbamazepine 300 mg-600 mg.  No side effect reported.  She is compliant with her medications.   She was also diagnosed with \"restless leg syndrome\".  She is currently on Requip 05 mg qhs, and it is working very well to control her symptoms.  She also mentioned that exercise helped a lot for her RLS.  If she does not exercise, she will have a lot of problems with RLS.    Her seizures started in 2000. Her typical seizures are described as she has an aura of right arm and leg tingling achy sensation, then followed by right arm and leg shaking arrhythmically which lasts for about 30 seconds to 1 minute. She was started on carbamazepine many years ago, and her seizures were well controlled until 05/2009 when she was trying to wean herself off the carbamazepine, then she started seizure recurrence. She had a series of a generalized tonic-clonic seizures at that time, so she was put back on carbamazepine. Her seizures usually occur in the early morning. In 12/2011, when she was seeing Dr. Villa, he was trying to wean the patient off the carbamazepine again, and in February, after she was " off the medication for a week, then she had another simple partial seizure with the right arm and the leg jerking for about 1 minute,   RISK FACTORS FOR SEIZURES: She had no history of head trauma with loss of consciousness. No history of CNS infections. No history of febrile convulsions. TRIGGERS FOR SEIZURES Unknown.   PAST MEDICAL HISTORY: None.   PAST SURGICAL HISTORY: None.   FAMILY HISTORY: No family history of seizures. Grandmother had a history of diabetes. Aunt had a history of brain hemorrhage. Mother and sister have RLS.    Current Outpatient Medications   Medication Sig Dispense Refill     carBAMazepine (CARBATROL) 300 MG 12 hr capsule TAKE 1 CAPSULE BY MOUTH DAILY IN THE MORNING,  AND 2 CAPSULES DAILY IN THE EVENING 90 capsule 0     folic acid (FOLVITE) 1 MG tablet Take 2 tablets (2 mg) by mouth daily 60 tablet 11     rOPINIRole (REQUIP) 0.5 MG tablet TAKE 1 TABLET BY MOUTH AT BEDTIME 90 tablet 3     albuterol (PROAIR HFA/PROVENTIL HFA/VENTOLIN HFA) 108 (90 Base) MCG/ACT inhaler Inhale 2 puffs into the lungs every 6 hours as needed for shortness of breath / dyspnea or wheezing (Patient not taking: Reported on 3/14/2019) 1 Inhaler 0     levETIRAcetam (KEPPRA) 750 MG tablet Take 1 tablet (750 mg) by mouth 2 times daily (Patient not taking: Reported on 3/14/2019) 60 tablet 1       ALLERGIES: No known drug allergies.   SOCIAL HISTORY: She is single, living with her mother. No smoking, no alcohol, no drug abuse.  She is in college, major in social work.  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  REVIEW OF SYSTEMS: The 10-point review of systems are completely negative except for seizures.   PREVIOUS DIAGNOSTIC TESTING: She had an MRI and EEG studies over 10 years ago. Results are not available at this time.     PHYSICAL EXAMINATION:  Blood pressure 144/90, pulse 74, temperature 98.4  F (36.9  C), weight 233 lb (105.7 kg), not currently breastfeeding.    General exam: General  Appearance: No acute distress. HEENT: Normocephalic, atraumatic. Neck: Supple. Extremities: No edema, no clubbing, no cyanosis.     Neurologic Exam: Alert and oriented x3. Speech fluent, appropriate. Normal attention. Cranial Nerves: Pupils are equal, round, reactive to light and accomodation. Extraocular movement intact. No facial weakness or asymmetry. Hearing normal. Motor Exam: Normal. Coordination:no ataxia. Gait and Station: normal.    Labs:    Component      Latest Ref Rng & Units 1/6/2018   Carbamazepine Total Level      4.0 - 12.0 ug/mL 9.7   10, 11 Epoxide Level      0.4 - 4.0 ug/mL 1.4   Free Carbamazepine Level Ug/Ml      0.6 - 4.2 ug/mL 1.8   Free Epoxide Level      0.2 - 2.0 ug/mL 0.6     IMPRESSION:   1. Epilepsy.  She is a 23-year-old, right-handed female with a history of seizures since 2000.  She had seen Dr. Villa from [facility] in the past. Since the last visit,  she had two seizures within one day in Dec, 2018.  The seizures were her usual seizures with right leg jerking movement that lasted for 1-2 min each, no loss of consciousness or amnesia.  She denied missing any medications.  She was sleep deprived and was sick with URI for a few days around that time.  This was the same situation as her last breakthrough seizure in Jan 2018. She was sent to ED and her tegretol level was 11.2.  She was started on Keppra 750 mg bid.  She could not tolerate the keppra and stopped herself after 2 weeks.  She is currently taking Carbamazepine 300 mg-600 mg.  No side effect reported.  She is compliant with her medications.     Patient was consulted regarding pregnancy and the possible fetal malformation with carbamazepine.  She voices understanding the the increased risk of fetal malformation with AEDs.  She is planning to get pregnant in about one year.    2. Restless leg syndrome. Improved. Continue on Requip.  She is currently on Requip 05 mg qhs, and it is working very well to control her  symptoms.  She also mentioned that exercise helped a lot for her RLS.  If she does not exercise, she will have a lot of problems with RLS.    PLAN:   1. Continue carbamazepine 300 mg in a.m. 600 mg p.m.    2. Continue Requip 0.5 mg qhs.  3. Folic acid 2 mg qd.  4. Check CBZ level and Na.  5. RTC in 12 months.      25 min was spent on the visit.  Over 50% of the time was spent on education, counseling about optimal seizure control and coordination of care.

## 2019-03-14 NOTE — LETTER
"3/14/2019       RE: Tita Prieto  : 1995   MRN: [id number]      Dear Colleague,    Thank you for referring your patient, Tita Prieto, to the [facility] EPILEPSY [facility]ARE at [facility]. Please see a copy of my visit note below.      CC: Follow up for seizures.    HPI: Ms. Tita Evans returns for follow up. She is by herself in the clinic today. She is a 23-year-old, right-handed female with a history of seizures since .  She had seen Dr. Villa from [facility] in the past. Since the last visit,  she had two seizures within one day in Dec, 2018.  The seizures were her usual seizures with right leg jerking movement that lasted for 1-2 min each, no loss of consciousness or amnesia.  She denied missing any medications.  She was sleep deprived and was sick with URI for a few days around that time.  This was the same situation as her last breakthrough seizure in 2018. She was sent to ED and her tegretol level was 11.2.  She was started on Keppra 750 mg bid.  She could not tolerate the keppra and stopped herself after 2 weeks.  She is currently taking Carbamazepine 300 mg-600 mg.  No side effect reported.  She is compliant with her medications.   She was also diagnosed with \"restless leg syndrome\".  She is currently on Requip 05 mg qhs, and it is working very well to control her symptoms.  She also mentioned that exercise helped a lot for her RLS.  If she does not exercise, she will have a lot of problems with RLS.    Her seizures started in . Her typical seizures are described as she has an aura of right arm and leg tingling achy sensation, then followed by right arm and leg shaking arrhythmically which lasts for about 30 seconds to 1 minute. She was started on carbamazepine many years ago, and her seizures were well controlled until 2009 when she was trying to wean herself off the carbamazepine, then she started seizure recurrence. She had a series of a " generalized tonic-clonic seizures at that time, so she was put back on carbamazepine. Her seizures usually occur in the early morning. In 12/2011, when she was seeing Dr. Villa, he was trying to wean the patient off the carbamazepine again, and in February, after she was off the medication for a week, then she had another simple partial seizure with the right arm and the leg jerking for about 1 minute,   RISK FACTORS FOR SEIZURES: She had no history of head trauma with loss of consciousness. No history of CNS infections. No history of febrile convulsions. TRIGGERS FOR SEIZURES Unknown.   PAST MEDICAL HISTORY: None.   PAST SURGICAL HISTORY: None.   FAMILY HISTORY: No family history of seizures. Grandmother had a history of diabetes. Aunt had a history of brain hemorrhage. Mother and sister have RLS.    Current Outpatient Medications   Medication Sig Dispense Refill     carBAMazepine (CARBATROL) 300 MG 12 hr capsule TAKE 1 CAPSULE BY MOUTH DAILY IN THE MORNING,  AND 2 CAPSULES DAILY IN THE EVENING 90 capsule 0     folic acid (FOLVITE) 1 MG tablet Take 2 tablets (2 mg) by mouth daily 60 tablet 11     rOPINIRole (REQUIP) 0.5 MG tablet TAKE 1 TABLET BY MOUTH AT BEDTIME 90 tablet 3     albuterol (PROAIR HFA/PROVENTIL HFA/VENTOLIN HFA) 108 (90 Base) MCG/ACT inhaler Inhale 2 puffs into the lungs every 6 hours as needed for shortness of breath / dyspnea or wheezing (Patient not taking: Reported on 3/14/2019) 1 Inhaler 0     levETIRAcetam (KEPPRA) 750 MG tablet Take 1 tablet (750 mg) by mouth 2 times daily (Patient not taking: Reported on 3/14/2019) 60 tablet 1       ALLERGIES: No known drug allergies.   SOCIAL HISTORY: She is single, living with her mother. No smoking, no alcohol, no drug abuse.  She is in college, major in social work.  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  REVIEW OF SYSTEMS: The 10-point review of systems are completely negative except for seizures.   PREVIOUS DIAGNOSTIC  TESTING: She had an MRI and EEG studies over 10 years ago. Results are not available at this time.     PHYSICAL EXAMINATION:  Blood pressure 144/90, pulse 74, temperature 98.4  F (36.9  C), weight 233 lb (105.7 kg), not currently breastfeeding.    General exam: General Appearance: No acute distress. HEENT: Normocephalic, atraumatic. Neck: Supple. Extremities: No edema, no clubbing, no cyanosis.     Neurologic Exam: Alert and oriented x3. Speech fluent, appropriate. Normal attention. Cranial Nerves: Pupils are equal, round, reactive to light and accomodation. Extraocular movement intact. No facial weakness or asymmetry. Hearing normal. Motor Exam: Normal. Coordination:no ataxia. Gait and Station: normal.    Labs:    Component      Latest Ref Rng & Units 1/6/2018   Carbamazepine Total Level      4.0 - 12.0 ug/mL 9.7   10, 11 Epoxide Level      0.4 - 4.0 ug/mL 1.4   Free Carbamazepine Level Ug/Ml      0.6 - 4.2 ug/mL 1.8   Free Epoxide Level      0.2 - 2.0 ug/mL 0.6     IMPRESSION:   1. Epilepsy.  She is a 23-year-old, right-handed female with a history of seizures since 2000.  She had seen Dr. Villa from [facility] in the past. Since the last visit,  she had two seizures within one day in Dec, 2018.  The seizures were her usual seizures with right leg jerking movement that lasted for 1-2 min each, no loss of consciousness or amnesia.  She denied missing any medications.  She was sleep deprived and was sick with URI for a few days around that time.  This was the same situation as her last breakthrough seizure in Jan 2018. She was sent to ED and her tegretol level was 11.2.  She was started on Keppra 750 mg bid.  She could not tolerate the keppra and stopped herself after 2 weeks.  She is currently taking Carbamazepine 300 mg-600 mg.  No side effect reported.  She is compliant with her medications.     Patient was consulted regarding pregnancy and the possible fetal malformation with carbamazepine.  She voices  understanding the the increased risk of fetal malformation with AEDs.  She is planning to get pregnant in about one year.    2. Restless leg syndrome. Improved. Continue on Requip.  She is currently on Requip 05 mg qhs, and it is working very well to control her symptoms.  She also mentioned that exercise helped a lot for her RLS.  If she does not exercise, she will have a lot of problems with RLS.    PLAN:   1. Continue carbamazepine 300 mg in a.m. 600 mg p.m.    2. Continue Requip 0.5 mg qhs.  3. Folic acid 2 mg qd.  4. Check CBZ level and Na.  5. RTC in 12 months.      25 min was spent on the visit.  Over 50% of the time was spent on education, counseling about optimal seizure control and coordination of care.      Again, thank you for allowing me to participate in the care of your patient.      Sincerely,    Maris Otero MD

## 2019-03-15 DIAGNOSIS — G25.81 RESTLESS LEG SYNDROME: ICD-10-CM

## 2019-03-15 RX ORDER — ROPINIROLE 0.5 MG/1
0.5 TABLET, FILM COATED ORAL AT BEDTIME
Qty: 90 TABLET | Refills: 3 | Status: SHIPPED | OUTPATIENT
Start: 2019-03-15 | End: 2019-11-14

## 2019-03-18 RX ORDER — ROPINIROLE 0.5 MG/1
0.5 TABLET, FILM COATED ORAL AT BEDTIME
Qty: 90 TABLET | Refills: 3 | OUTPATIENT
Start: 2019-03-18

## 2019-04-09 DIAGNOSIS — G25.81 RESTLESS LEG SYNDROME: ICD-10-CM

## 2019-04-09 RX ORDER — ROPINIROLE 0.5 MG/1
TABLET, FILM COATED ORAL
Qty: 90 TABLET | Refills: 3 | Status: SHIPPED | OUTPATIENT
Start: 2019-04-09 | End: 2020-02-20

## 2019-04-09 NOTE — TELEPHONE ENCOUNTER
Medication request meets [place] Medication Refill Protocol - Seizure Medications (non-controlled) requirements.  Change of pharmacy.

## 2019-06-15 ENCOUNTER — HOSPITAL ENCOUNTER (EMERGENCY)
Facility: CLINIC | Age: 24
Discharge: HOME | End: 2019-06-15
Attending: PHYSICIAN ASSISTANT | Admitting: PHYSICIAN ASSISTANT
Payer: COMMERCIAL

## 2019-06-15 VITALS
HEART RATE: 81 BPM | OXYGEN SATURATION: 97 % | TEMPERATURE: 98.6 F | SYSTOLIC BLOOD PRESSURE: 136 MMHG | RESPIRATION RATE: 20 BRPM | DIASTOLIC BLOOD PRESSURE: 82 MMHG

## 2019-06-15 DIAGNOSIS — J02.9 ACUTE PHARYNGITIS, UNSPECIFIED ETIOLOGY: ICD-10-CM

## 2019-06-15 LAB
INTERNAL QC OK POCT: YES
S PYO AG THROAT QL IA.RAPID: NEGATIVE

## 2019-06-15 PROCEDURE — 87081 CULTURE SCREEN ONLY: CPT | Performed by: PHYSICIAN ASSISTANT

## 2019-06-15 PROCEDURE — G0463 HOSPITAL OUTPT CLINIC VISIT: HCPCS | Performed by: PHYSICIAN ASSISTANT

## 2019-06-15 PROCEDURE — 87880 STREP A ASSAY W/OPTIC: CPT | Performed by: PHYSICIAN ASSISTANT

## 2019-06-15 PROCEDURE — 99213 OFFICE O/P EST LOW 20 MIN: CPT | Mod: Z6 | Performed by: PHYSICIAN ASSISTANT

## 2019-06-15 NOTE — ED AVS SNAPSHOT
[facility] Emergency Department  [address] [address]  Phone:  [phone]  Fax:  [phone]                                    Tita Prieto   MRN: [id number]    Department:  [facility] Emergency Department   Date of Visit:  6/15/2019           After Visit Summary Signature Page    I have received my discharge instructions, and my questions have been answered. I have discussed any challenges I see with this plan with the nurse or doctor.    ..........................................................................................................................................  Patient/Patient Representative Signature      ..........................................................................................................................................  Patient Representative Print Name and Relationship to Patient    ..................................................               ................................................  Date                                   Time    ..........................................................................................................................................  Reviewed by Signature/Title    ...................................................              ..............................................  Date                                               Time          22EPIC Rev 08/18

## 2019-06-15 NOTE — ED PROVIDER NOTES
History     Chief Complaint   Patient presents with     Pharyngitis     for 2 weeks     HPI  Tita Prieto is a 23 year old female who presents to the urgent care with concern over sore throat is been present for approximate last 2 weeks.  Patient also additionally complains of pain in her teeth, bilateral ear pain.  She has had minimal nasal congestion, no significant fever, chills, cough, dyspnea, wheezing, nausea, vomiting, diarrhea or abdominal pain.  She states she has had a prior history of tonsillar stones and did recently self treated for stones with salt water gargles and manual removal.  She denies any close contacts with strep throat.     Allergies:  No Known Allergies    Problem List:    Patient Active Problem List    Diagnosis Date Noted     Seizure (H) 12/10/2018     Priority: Medium     Morbid obesity with BMI of 40.0-44.9, adult (H) 2017     Priority: Medium     Irritable bowel syndrome with diarrhea 2017     Priority: Medium     Overview:   2017: Fiber       Guillain Barré syndrome (H) 2016     Priority: Medium     Overview:   ?? 2015 [facility] in [place] hospital visit. Avoiding flu vaccines.       Hand weakness 2015     Priority: Medium     Contraception 2015     Priority: Medium     Overview:   Nexplanon placed 2015       Mixed hyperlipidemia 2014     Priority: Medium     Overview:     Last Lipids:  Last Lipids:  Chol: 2016 207   101  HDL: 2016 49   LDL: 2016 138  LDL DIRECT: . No results found in past 5 years       Restless legs syndrome (RLS) 2013     Priority: Medium     Overview:   : Started on Requip  She is on the 0.25mg at bedtime/night and is able to get 6 hours of sleep.       Seizure disorder (H) 2013     Priority: Medium     Overview:   Carbamezapine 300mg am and 600mg pm.   Sees neurologist  Last seizure episode- 10/2015        Past Medical History:    Past Medical History:    Diagnosis Date     Seizures (H)      Past Surgical History:    Past Surgical History:   Procedure Laterality Date     SURGICAL HISTORY OF -   06/03/1994    PE tubes       Family History:    Family History   Problem Relation Age of Onset     No Known Problems Mother      No Known Problems Father        Social History:  Marital Status:   [2]  Social History     Tobacco Use     Smoking status: Never Smoker     Smokeless tobacco: Never Used   Substance Use Topics     Alcohol use: No     Drug use: No      Medications:      albuterol (PROAIR HFA/PROVENTIL HFA/VENTOLIN HFA) 108 (90 Base) MCG/ACT inhaler   carBAMazepine (CARBATROL) 300 MG 12 hr capsule   folic acid (FOLVITE) 1 MG tablet   levETIRAcetam (KEPPRA) 750 MG tablet   rOPINIRole (REQUIP) 0.5 MG tablet   rOPINIRole (REQUIP) 0.5 MG tablet     Review of Systems  CONSTITUTIONAL:NEGATIVE for fever, chills, change in weight  INTEGUMENTARY/SKIN: NEGATIVE for worrisome rashes, moles or lesions  EYES: NEGATIVE for vision changes or irritation  ENT/MOUTH: POSITIVE for sore throat, bilateral ear pain and minimal nasal congestion   RESP:NEGATIVE for cough SOB/dyspnea and wheezing  GI: NEGATIVE for abdominal pain, diarrhea and vomiting  Physical Exam   BP: 136/82  Pulse: 81  Temp: 98.6  F (37  C)  Resp: 20  SpO2: 97 %  Physical Exam  GENERAL APPEARANCE: healthy, alert and no distress  EYES: EOMI,  PERRL, conjunctiva clear  HENT: ear canals and TM's normal.  Mild pharyngeal erythema, edema  NECK: supple, nontender, no lymphadenopathy  RESP: lungs clear to auscultation - no rales, rhonchi or wheezes  CV: regular rates and rhythm, normal S1 S2, no murmur noted  SKIN: no suspicious lesions or rashes  ED Course        Procedures               Critical Care time:  none               Results for orders placed or performed during the hospital encounter of 06/15/19   Rapid strep group A screen POCT   Result Value Ref Range    Rapid Strep A Screen Negative neg    Internal QC OK  Yes    Beta strep group A r/o culture   Result Value Ref Range    Specimen Description Throat     Special Requests Specimen collected in eSwab transport (white cap)     Culture Micro Culture negative < 24 hours, reincubate      Medications - No data to display    Assessments & Plan (with Medical Decision Making)     I have reviewed the nursing notes.    I have reviewed the findings, diagnosis, plan and need for follow up with the patient.          Medication List      There are no discharge medications for this visit.       Final diagnoses:   Acute pharyngitis, unspecified etiology     RST negative with culture pending.  No evidence of peritonsillar cellulitis or abscess.   I have low concern for mono at this time, however I did discuss risk/benefits of testing and patient agreed to defer at this time.  Similarly discussed possibility of post-nasal drainage, esophageal reflux contributing to throat pain, however I have low suspicion for these as well.  She was instructed to continue OTC symptomatic treatment.  Follow up with PCP if no improvement in 5-7 days.  Worrisome reasons to seek care sooner discussed.      Disclaimer: This note consists of symbols derived from keyboarding, dictation, and/or voice recognition software. As a result, there may be errors in the script that have gone undetected.  Please consider this when interpreting information found in the chart.    6/15/2019   [facility] EMERGENCY DEPARTMENT     Alida Fontenot PA-C  06/16/19 2359

## 2019-06-17 LAB
BACTERIA SPEC CULT: NORMAL
Lab: NORMAL
SPECIMEN SOURCE: NORMAL

## 2019-09-23 ENCOUNTER — TELEPHONE (OUTPATIENT)
Dept: NEUROLOGY | Facility: CLINIC | Age: 24
End: 2019-09-23

## 2019-09-23 DIAGNOSIS — G40.909 SEIZURE DISORDER (H): Primary | ICD-10-CM

## 2019-09-23 NOTE — TELEPHONE ENCOUNTER
What is the concern that needs to be addressed by a nurse? Patient is calling because she is pregnant and would like a call back.    May a detailed message be left on voicemail? yes    Date of last office visit:03/14/2019    Message routed to: [facility] RN POOL

## 2019-09-26 NOTE — TELEPHONE ENCOUNTER
Tita is 9 weeks gravid.  She is doing well, no concerns at this time.  As discussed with Tita, I will FAX a standing lab order to [facility], [place].  She is agreeable to monthly lab draws (CBZ and LEV).  She has been added to pregnancy tracking / will continue to monitor.

## 2019-10-09 ENCOUNTER — TRANSFERRED RECORDS (OUTPATIENT)
Dept: HEALTH INFORMATION MANAGEMENT | Facility: CLINIC | Age: 24
End: 2019-10-09

## 2019-11-14 ENCOUNTER — HOSPITAL ENCOUNTER (EMERGENCY)
Facility: CLINIC | Age: 24
Discharge: HOME | End: 2019-11-14
Attending: STUDENT IN AN ORGANIZED HEALTH CARE EDUCATION/TRAINING PROGRAM | Admitting: STUDENT IN AN ORGANIZED HEALTH CARE EDUCATION/TRAINING PROGRAM
Payer: OTHER GOVERNMENT

## 2019-11-14 ENCOUNTER — APPOINTMENT (OUTPATIENT)
Dept: ULTRASOUND IMAGING | Facility: CLINIC | Age: 24
End: 2019-11-14
Attending: STUDENT IN AN ORGANIZED HEALTH CARE EDUCATION/TRAINING PROGRAM
Payer: OTHER GOVERNMENT

## 2019-11-14 VITALS
HEIGHT: 68 IN | WEIGHT: 240 LBS | RESPIRATION RATE: 16 BRPM | TEMPERATURE: 97.6 F | DIASTOLIC BLOOD PRESSURE: 70 MMHG | HEART RATE: 76 BPM | SYSTOLIC BLOOD PRESSURE: 130 MMHG | BODY MASS INDEX: 36.37 KG/M2 | OXYGEN SATURATION: 100 %

## 2019-11-14 DIAGNOSIS — O44.02 PLACENTA PREVIA IN SECOND TRIMESTER: ICD-10-CM

## 2019-11-14 LAB
ALBUMIN SERPL-MCNC: 2.9 G/DL (ref 3.4–5)
ALP SERPL-CCNC: 75 U/L (ref 40–150)
ALT SERPL W P-5'-P-CCNC: 27 U/L (ref 0–50)
ANION GAP SERPL CALCULATED.3IONS-SCNC: 5 MMOL/L (ref 3–14)
AST SERPL W P-5'-P-CCNC: 24 U/L (ref 0–45)
B-HCG SERPL-ACNC: ABNORMAL IU/L (ref 0–5)
BASOPHILS # BLD AUTO: 0 10E9/L (ref 0–0.2)
BASOPHILS NFR BLD AUTO: 0.2 %
BILIRUB SERPL-MCNC: 0.3 MG/DL (ref 0.2–1.3)
BUN SERPL-MCNC: 9 MG/DL (ref 7–30)
CALCIUM SERPL-MCNC: 8.9 MG/DL (ref 8.5–10.1)
CHLORIDE SERPL-SCNC: 108 MMOL/L (ref 94–109)
CO2 SERPL-SCNC: 22 MMOL/L (ref 20–32)
CREAT SERPL-MCNC: 0.36 MG/DL (ref 0.52–1.04)
DIFFERENTIAL METHOD BLD: NORMAL
EOSINOPHIL # BLD AUTO: 0 10E9/L (ref 0–0.7)
EOSINOPHIL NFR BLD AUTO: 0.5 %
ERYTHROCYTE [DISTWIDTH] IN BLOOD BY AUTOMATED COUNT: 12.3 % (ref 10–15)
GFR SERPL CREATININE-BSD FRML MDRD: >90 ML/MIN/{1.73_M2}
GLUCOSE SERPL-MCNC: 83 MG/DL (ref 70–99)
HCT VFR BLD AUTO: 37.7 % (ref 35–47)
HGB BLD-MCNC: 12.5 G/DL (ref 11.7–15.7)
IMM GRANULOCYTES # BLD: 0 10E9/L (ref 0–0.4)
IMM GRANULOCYTES NFR BLD: 0.2 %
LYMPHOCYTES # BLD AUTO: 1.7 10E9/L (ref 0.8–5.3)
LYMPHOCYTES NFR BLD AUTO: 19.5 %
MCH RBC QN AUTO: 29.8 PG (ref 26.5–33)
MCHC RBC AUTO-ENTMCNC: 33.2 G/DL (ref 31.5–36.5)
MCV RBC AUTO: 90 FL (ref 78–100)
MONOCYTES # BLD AUTO: 0.5 10E9/L (ref 0–1.3)
MONOCYTES NFR BLD AUTO: 6.3 %
NEUTROPHILS # BLD AUTO: 6.3 10E9/L (ref 1.6–8.3)
NEUTROPHILS NFR BLD AUTO: 73.3 %
NRBC # BLD AUTO: 0 10*3/UL
NRBC BLD AUTO-RTO: 0 /100
PLATELET # BLD AUTO: 286 10E9/L (ref 150–450)
POTASSIUM SERPL-SCNC: 3.9 MMOL/L (ref 3.4–5.3)
PROT SERPL-MCNC: 7.5 G/DL (ref 6.8–8.8)
RBC # BLD AUTO: 4.19 10E12/L (ref 3.8–5.2)
SODIUM SERPL-SCNC: 135 MMOL/L (ref 133–144)
WBC # BLD AUTO: 8.6 10E9/L (ref 4–11)

## 2019-11-14 PROCEDURE — 84702 CHORIONIC GONADOTROPIN TEST: CPT | Performed by: STUDENT IN AN ORGANIZED HEALTH CARE EDUCATION/TRAINING PROGRAM

## 2019-11-14 PROCEDURE — 99284 EMERGENCY DEPT VISIT MOD MDM: CPT | Mod: 25 | Performed by: STUDENT IN AN ORGANIZED HEALTH CARE EDUCATION/TRAINING PROGRAM

## 2019-11-14 PROCEDURE — 99285 EMERGENCY DEPT VISIT HI MDM: CPT | Mod: Z6 | Performed by: STUDENT IN AN ORGANIZED HEALTH CARE EDUCATION/TRAINING PROGRAM

## 2019-11-14 PROCEDURE — 76805 OB US >/= 14 WKS SNGL FETUS: CPT

## 2019-11-14 PROCEDURE — 85025 COMPLETE CBC W/AUTO DIFF WBC: CPT | Performed by: STUDENT IN AN ORGANIZED HEALTH CARE EDUCATION/TRAINING PROGRAM

## 2019-11-14 PROCEDURE — 80053 COMPREHEN METABOLIC PANEL: CPT | Performed by: STUDENT IN AN ORGANIZED HEALTH CARE EDUCATION/TRAINING PROGRAM

## 2019-11-14 RX ORDER — PRENATAL VIT/IRON FUM/FOLIC AC 27MG-0.8MG
1 TABLET ORAL EVERY EVENING
COMMUNITY

## 2019-11-14 ASSESSMENT — MIFFLIN-ST. JEOR: SCORE: 1877.13

## 2019-11-14 NOTE — ED AVS SNAPSHOT
[facility] Emergency Department  [address] [address]  Phone:  [phone]  Fax:  [phone]                                    Tita Prieto   MRN: [id number]    Department:  [facility] Emergency Department   Date of Visit:  11/14/2019           After Visit Summary Signature Page    I have received my discharge instructions, and my questions have been answered. I have discussed any challenges I see with this plan with the nurse or doctor.    ..........................................................................................................................................  Patient/Patient Representative Signature      ..........................................................................................................................................  Patient Representative Print Name and Relationship to Patient    ..................................................               ................................................  Date                                   Time    ..........................................................................................................................................  Reviewed by Signature/Title    ...................................................              ..............................................  Date                                               Time          22EPIC Rev 08/18

## 2019-11-14 NOTE — ED PROVIDER NOTES
History     Chief Complaint   Patient presents with     Vaginal Bleeding     16 wks preg; bleeding started last night     HPI  Tita Prieto is a 24 year old  female of estimated 16 weeks gestation with past medical history to include seizure disorder presents for evaluation of abdominal cramping and vaginal bleeding.  Patient states that around midnight last night she began feeling some mild suprapubic cramping with some light vaginal bleeding, quantifies as not enough to fill a pad and more mild than a menstrual period.  The symptoms persisted throughout the night but vaginal bleeding stopped early this morning.  She has taken no analgesic or symptomatic medications.  She denies fever, chills, chest pain, respiratory infectious symptoms, headache, lightheadedness, back pain, or urinary symptoms.  Of note, patient is seeing Dr. Cueva of [facility] for prenatal care.    Allergies:  No Known Allergies    Problem List:    Patient Active Problem List    Diagnosis Date Noted     Seizure (H) 12/10/2018     Priority: Medium     Morbid obesity with BMI of 40.0-44.9, adult (H) 2017     Priority: Medium     Irritable bowel syndrome with diarrhea 2017     Priority: Medium     Overview:   2017: Fiber       Guillain Barré syndrome (H) 2016     Priority: Medium     Overview:   ?? 2015 [facility] in [place] hospital visit. Avoiding flu vaccines.       Hand weakness 2015     Priority: Medium     Contraception 2015     Priority: Medium     Overview:   Nexplanon placed 2015       Mixed hyperlipidemia 2014     Priority: Medium     Overview:     Last Lipids:  Last Lipids:  Chol: 2016 207   101  HDL: 2016 49   LDL: 2016 138  LDL DIRECT: . No results found in past 5 years       Restless legs syndrome (RLS) 2013     Priority: Medium     Overview:   : Started on Requip  She is on the 0.25mg at bedtime/night and is able to get 6 hours of  "sleep.       Seizure disorder (H) 03/08/2013     Priority: Medium     Overview:   Carbamezapine 300mg am and 600mg pm.   Sees neurologist  Last seizure episode- 10/2015          Past Medical History:    Past Medical History:   Diagnosis Date     Seizures (H)        Past Surgical History:    Past Surgical History:   Procedure Laterality Date     SURGICAL HISTORY OF -   06/03/1994    PE tubes       Family History:    Family History   Problem Relation Age of Onset     No Known Problems Mother      No Known Problems Father        Social History:  Marital Status:   [2]  Social History     Tobacco Use     Smoking status: Never Smoker     Smokeless tobacco: Never Used   Substance Use Topics     Alcohol use: No     Drug use: No        Medications:    carBAMazepine (CARBATROL) 300 MG 12 hr capsule  folic acid (FOLVITE) 1 MG tablet  Prenatal Vit-Fe Fumarate-FA (PRENATAL MULTIVITAMIN W/IRON) 27-0.8 MG tablet  rOPINIRole (REQUIP) 0.5 MG tablet  albuterol (PROAIR HFA/PROVENTIL HFA/VENTOLIN HFA) 108 (90 Base) MCG/ACT inhaler          Review of Systems  Constitutional:  Negative for fever or recent illness.  Cardiovascular:  Negative for chest pain.  Respiratory:  Negative for cough or shortness of breath.  Gastrointestinal: Positive for mild suprapubic cramping.  Negative for nausea, vomiting, or diarrhea.  Denies blood with bowel movements.  Genitourinary: Positive for vaginal bleeding, resolved.  Negative for dysuria, vaginal comfort or discharge.  Musculoskeletal: Negative for back or flank pain.  Neurological:  Negative for headache or dizziness.    All others reviewed and are negative.      Physical Exam   BP: 120/71  Pulse: 87  Heart Rate: 90  Temp: 97.6  F (36.4  C)  Resp: 16  Height: 172.7 cm (5' 8\")  Weight: 108.9 kg (240 lb)  SpO2: 100 %      Physical Exam  Constitutional:  Well developed, well nourished.  Appears nontoxic and in no acute distress.    HENT:  Normocephalic and atraumatic.  Symmetric in " appearance.  Eyes:  Conjunctivae are normal.  Neck:  Neck supple.  Cardiovascular:  No cyanosis.  RRR.  No audible murmurs noted.  No lower extremity edema or asymmetry.   Respiratory:  Effort normal without sign of respiratory distress.  CTAB without diminished regions.    Gastrointestinal:  Soft nondistended abdomen.  Nontender and without guarding.  No rigidity or rebound tenderness.  Negative Del Rio's sign.  Negative McBurney's point.    Musculoskeletal:  Moves extremities spontaneously.  Neurological:  Patient is alert.  Skin:  Skin is warm and dry.  Psychiatric:  Normal mood and affect.      ED Course        Procedures              Critical Care time:  none               Results for orders placed or performed during the hospital encounter of 11/14/19 (from the past 24 hour(s))   CBC with platelets differential   Result Value Ref Range    WBC 8.6 4.0 - 11.0 10e9/L    RBC Count 4.19 3.8 - 5.2 10e12/L    Hemoglobin 12.5 11.7 - 15.7 g/dL    Hematocrit 37.7 35.0 - 47.0 %    MCV 90 78 - 100 fl    MCH 29.8 26.5 - 33.0 pg    MCHC 33.2 31.5 - 36.5 g/dL    RDW 12.3 10.0 - 15.0 %    Platelet Count 286 150 - 450 10e9/L    Diff Method Automated Method     % Neutrophils 73.3 %    % Lymphocytes 19.5 %    % Monocytes 6.3 %    % Eosinophils 0.5 %    % Basophils 0.2 %    % Immature Granulocytes 0.2 %    Nucleated RBCs 0 0 /100    Absolute Neutrophil 6.3 1.6 - 8.3 10e9/L    Absolute Lymphocytes 1.7 0.8 - 5.3 10e9/L    Absolute Monocytes 0.5 0.0 - 1.3 10e9/L    Absolute Eosinophils 0.0 0.0 - 0.7 10e9/L    Absolute Basophils 0.0 0.0 - 0.2 10e9/L    Abs Immature Granulocytes 0.0 0 - 0.4 10e9/L    Absolute Nucleated RBC 0.0    Comprehensive metabolic panel   Result Value Ref Range    Sodium 135 133 - 144 mmol/L    Potassium 3.9 3.4 - 5.3 mmol/L    Chloride 108 94 - 109 mmol/L    Carbon Dioxide 22 20 - 32 mmol/L    Anion Gap 5 3 - 14 mmol/L    Glucose 83 70 - 99 mg/dL    Urea Nitrogen 9 7 - 30 mg/dL    Creatinine 0.36 (L) 0.52 - 1.04  mg/dL    GFR Estimate >90 >60 mL/min/[1.73_m2]    GFR Estimate If Black >90 >60 mL/min/[1.73_m2]    Calcium 8.9 8.5 - 10.1 mg/dL    Bilirubin Total 0.3 0.2 - 1.3 mg/dL    Albumin 2.9 (L) 3.4 - 5.0 g/dL    Protein Total 7.5 6.8 - 8.8 g/dL    Alkaline Phosphatase 75 40 - 150 U/L    ALT 27 0 - 50 U/L    AST 24 0 - 45 U/L   HCG quantitative pregnancy (blood)   Result Value Ref Range    HCG Quantitative Serum 27,721 (H) 0 - 5 IU/L   US OB > 14 Weeks    Narrative    ULTRASOUND OB GREATER THAN 14 WEEKS 11/14/2019 2:12 PM     HISTORY: Vaginal bleeding at estimated 16 weeks gestation.     FINDINGS: OB ultrasound demonstrates a fetus in breech presentation  with cardiac activity measuring 139 bpm. Placenta is anterior with a  small amount of inferior placenta covering the internal cervical os  consistent with placenta previa or marginal previa. Cervix measures  3.5 cm in length and the internal os is closed. Corpus luteal cyst  left ovary is noted. Right ovary is unremarkable. No free pelvic  fluid.      Impression    IMPRESSION: Placenta previa or marginal previa. Live intrauterine  pregnancy is in breech presentation with cardiac activity measuring  139 bpm.    LANI HARRISON MD       Medications - No data to display    Assessments & Plan (with Medical Decision Making)   Tita Prieto is a 24 year old female who presents to the department for evaluation of abdominal cramping and vaginal bleeding in the second trimester of her first pregnancy.  Symptoms subsided since onset last evening.  She denies pelvic pain or vaginal discharge and has no concern for sexually transmitted infections, invasive pelvic exam unlikely to be contributory.  CBC without anemia and patient is hemodynamically stable.  However her formal ultrasound has suspicion for placenta previa or marginal previa with live intrauterine pregnancy.  Consulted her obstetric provider Dr. Cueva who recalls the patient well, recommends pelvic rest and follow-up  within 1 week's time for evaluation and continued management planning.  Patient has been informed of this recommendation, plans to call clinic tomorrow to schedule close follow-up for monitoring.        Disclaimer:  This note consists of symbols derived from keyboarding, dictation, and/or voice recognition software.  As a result, there may be errors in the script that have gone undetected.  Please consider this when interpreting information found in the chart.        I have reviewed the nursing notes.    I have reviewed the findings, diagnosis, plan and need for follow up with the patient.       Discharge Medication List as of 11/14/2019  2:59 PM          Final diagnoses:   Placenta previa in second trimester       11/14/2019   [facility] EMERGENCY DEPARTMENT     Adolfo Badillo,   11/14/19 1875

## 2019-11-14 NOTE — ED NOTES
Patient states she is 16 weeks pregnant and is spotting, not enough to even wear a pad. She is also cramping and is worried. First pregnancy.

## 2019-11-19 ENCOUNTER — TELEPHONE (OUTPATIENT)
Dept: NEUROLOGY | Facility: CLINIC | Age: 24
End: 2019-11-19

## 2019-11-19 NOTE — TELEPHONE ENCOUNTER
DMV form received, via mail on 11/19/19. Form placed in RN folder for further review and completion.

## 2019-12-10 ENCOUNTER — HOSPITAL ENCOUNTER (EMERGENCY)
Facility: CLINIC | Age: 24
Discharge: HOME | End: 2019-12-10
Attending: PHYSICIAN ASSISTANT | Admitting: PHYSICIAN ASSISTANT
Payer: OTHER GOVERNMENT

## 2019-12-10 VITALS
HEIGHT: 68 IN | BODY MASS INDEX: 36.37 KG/M2 | OXYGEN SATURATION: 98 % | DIASTOLIC BLOOD PRESSURE: 65 MMHG | RESPIRATION RATE: 16 BRPM | WEIGHT: 240 LBS | TEMPERATURE: 98.4 F | HEART RATE: 89 BPM | SYSTOLIC BLOOD PRESSURE: 123 MMHG

## 2019-12-10 DIAGNOSIS — R23.3 PETECHIAL RASH: ICD-10-CM

## 2019-12-10 LAB
ALBUMIN SERPL-MCNC: 3 G/DL (ref 3.4–5)
ALP SERPL-CCNC: 87 U/L (ref 40–150)
ALT SERPL W P-5'-P-CCNC: 25 U/L (ref 0–50)
ANION GAP SERPL CALCULATED.3IONS-SCNC: 8 MMOL/L (ref 3–14)
AST SERPL W P-5'-P-CCNC: 15 U/L (ref 0–45)
BASOPHILS # BLD AUTO: 0 10E9/L (ref 0–0.2)
BASOPHILS NFR BLD AUTO: 0.2 %
BILIRUB SERPL-MCNC: 0.2 MG/DL (ref 0.2–1.3)
BUN SERPL-MCNC: 11 MG/DL (ref 7–30)
CALCIUM SERPL-MCNC: 9.4 MG/DL (ref 8.5–10.1)
CHLORIDE SERPL-SCNC: 108 MMOL/L (ref 94–109)
CO2 SERPL-SCNC: 23 MMOL/L (ref 20–32)
CREAT SERPL-MCNC: 0.42 MG/DL (ref 0.52–1.04)
DIFFERENTIAL METHOD BLD: NORMAL
EOSINOPHIL # BLD AUTO: 0.1 10E9/L (ref 0–0.7)
EOSINOPHIL NFR BLD AUTO: 0.9 %
ERYTHROCYTE [DISTWIDTH] IN BLOOD BY AUTOMATED COUNT: 12.2 % (ref 10–15)
GFR SERPL CREATININE-BSD FRML MDRD: >90 ML/MIN/{1.73_M2}
GLUCOSE SERPL-MCNC: 77 MG/DL (ref 70–99)
HCT VFR BLD AUTO: 39.1 % (ref 35–47)
HGB BLD-MCNC: 13 G/DL (ref 11.7–15.7)
IMM GRANULOCYTES # BLD: 0 10E9/L (ref 0–0.4)
IMM GRANULOCYTES NFR BLD: 0.2 %
LYMPHOCYTES # BLD AUTO: 1.5 10E9/L (ref 0.8–5.3)
LYMPHOCYTES NFR BLD AUTO: 17.3 %
MCH RBC QN AUTO: 30 PG (ref 26.5–33)
MCHC RBC AUTO-ENTMCNC: 33.2 G/DL (ref 31.5–36.5)
MCV RBC AUTO: 90 FL (ref 78–100)
MONOCYTES # BLD AUTO: 0.5 10E9/L (ref 0–1.3)
MONOCYTES NFR BLD AUTO: 6 %
NEUTROPHILS # BLD AUTO: 6.6 10E9/L (ref 1.6–8.3)
NEUTROPHILS NFR BLD AUTO: 75.4 %
NRBC # BLD AUTO: 0 10*3/UL
NRBC BLD AUTO-RTO: 0 /100
PLATELET # BLD AUTO: 280 10E9/L (ref 150–450)
POTASSIUM SERPL-SCNC: 3.4 MMOL/L (ref 3.4–5.3)
PROT SERPL-MCNC: 7.8 G/DL (ref 6.8–8.8)
RBC # BLD AUTO: 4.34 10E12/L (ref 3.8–5.2)
SODIUM SERPL-SCNC: 139 MMOL/L (ref 133–144)
WBC # BLD AUTO: 8.7 10E9/L (ref 4–11)

## 2019-12-10 PROCEDURE — 99214 OFFICE O/P EST MOD 30 MIN: CPT | Mod: Z6 | Performed by: PHYSICIAN ASSISTANT

## 2019-12-10 PROCEDURE — 85025 COMPLETE CBC W/AUTO DIFF WBC: CPT | Performed by: PHYSICIAN ASSISTANT

## 2019-12-10 PROCEDURE — 85730 THROMBOPLASTIN TIME PARTIAL: CPT | Performed by: PHYSICIAN ASSISTANT

## 2019-12-10 PROCEDURE — G0463 HOSPITAL OUTPT CLINIC VISIT: HCPCS | Performed by: PHYSICIAN ASSISTANT

## 2019-12-10 PROCEDURE — 80053 COMPREHEN METABOLIC PANEL: CPT | Performed by: PHYSICIAN ASSISTANT

## 2019-12-10 ASSESSMENT — MIFFLIN-ST. JEOR: SCORE: 1877.13

## 2019-12-10 NOTE — ED AVS SNAPSHOT
[facility] Emergency Department  [address] [address]  Phone:  [phone]  Fax:  [phone]                                    Tita Prieto   MRN: [id number]    Department:  [facility] Emergency Department   Date of Visit:  12/10/2019           After Visit Summary Signature Page    I have received my discharge instructions, and my questions have been answered. I have discussed any challenges I see with this plan with the nurse or doctor.    ..........................................................................................................................................  Patient/Patient Representative Signature      ..........................................................................................................................................  Patient Representative Print Name and Relationship to Patient    ..................................................               ................................................  Date                                   Time    ..........................................................................................................................................  Reviewed by Signature/Title    ...................................................              ..............................................  Date                                               Time          22EPIC Rev 08/18

## 2019-12-10 NOTE — ED PROVIDER NOTES
History     Chief Complaint   Patient presents with     Rash     facial rash, fine,small papular rash     HPI  Tita Prieto is a 24 year old female with past medical history significant for seizure disorder, IBS, history of Guillain Barré syndrome who is currently 20 weeks pregnant who presents to the urgent care with concern over facial rash which has been present for the last 2 to 3 days.  She describes rash as pruritic.  She additionally complains of some swelling of her cheeks around her eyes. She became more concerned when she stated symptoms spread down her neck.   She also notes that she has had nosebleeds every other day for the last several days.  She reports that she did have nosebleeds for  Since she was a child however this may be slightly more than her typical.  She has not had any recent fever, chills, myalgias, cough, dyspnea, wheezing, vomiting, diarrhea, abdominal pain, vaginal bleeding or spotting.  She denies any ecchymosis, gum bleeding with other ENT, melena, hematochezia hematuria.  She denies any recent changes in soaps, detergents, lotions, foods, medications previously.  She did contact her primary care provider who recommended OTC antihistamine however patient instead presented to the UC due to concerns over swelling.      Allergies:  No Known Allergies    Problem List:    Patient Active Problem List    Diagnosis Date Noted     Seizure (H) 12/10/2018     Priority: Medium     Morbid obesity with BMI of 40.0-44.9, adult (H) 01/12/2017     Priority: Medium     Irritable bowel syndrome with diarrhea 01/12/2017     Priority: Medium     Overview:   1/2017: Fiber       Guillain Barré syndrome (H) 05/02/2016     Priority: Medium     Overview:   ?? 11/2015 [facility] in [place] hospital visit. Avoiding flu vaccines.       Hand weakness 11/16/2015     Priority: Medium     Contraception 07/03/2015     Priority: Medium     Overview:   Nexplanon placed 5/7/2015       Mixed  hyperlipidemia 2014     Priority: Medium     Overview:     Last Lipids:  Last Lipids:  Chol: 2016 207   101  HDL: 2016 49   LDL: 2016 138  LDL DIRECT: . No results found in past 5 years       Restless legs syndrome (RLS) 2013     Priority: Medium     Overview:   : Started on Requip  She is on the 0.25mg at bedtime/night and is able to get 6 hours of sleep.       Seizure disorder (H) 2013     Priority: Medium     Overview:   Carbamezapine 300mg am and 600mg pm.   Sees neurologist  Last seizure episode- 10/2015        Past Medical History:    Past Medical History:   Diagnosis Date     Seizures (H)      Past Surgical History:    Past Surgical History:   Procedure Laterality Date     SURGICAL HISTORY OF -   1994    PE tubes     Family History:    Family History   Problem Relation Age of Onset     No Known Problems Mother      No Known Problems Father      Social History:  Marital Status:   [2]  Social History     Tobacco Use     Smoking status: Never Smoker     Smokeless tobacco: Never Used   Substance Use Topics     Alcohol use: No     Drug use: No      Medications:    albuterol (PROAIR HFA/PROVENTIL HFA/VENTOLIN HFA) 108 (90 Base) MCG/ACT inhaler  carBAMazepine (CARBATROL) 300 MG 12 hr capsule  folic acid (FOLVITE) 1 MG tablet  Prenatal Vit-Fe Fumarate-FA (PRENATAL MULTIVITAMIN W/IRON) 27-0.8 MG tablet  rOPINIRole (REQUIP) 0.5 MG tablet      Review of Systems  CONSTITUTIONAL:NEGATIVE for fever, chills, change in weight  INTEGUMENTARY/SKIN: POSITIVE for pruritic rash on face and neck  EYES: POSITIVE for eyelid swelling and NEGATIVE for redness, discharge, vision changes   ENT/MOUTH: NEGATIVE for ear, mouth and throat problems  RESP:NEGATIVE for significant cough or SOB  GI: NEGATIVE for nausea, abdominal pain, heartburn, or change in bowel habits  :  NEGATIVE for vaginal bleeding/spotting  Physical Exam   BP: 123/65  Pulse: 89  Temp: 98.4  F (36.9  C)  Resp:  "16  Height: 172.7 cm (5' 8\")  Weight: 108.9 kg (240 lb)  SpO2: 98 %    Physical Exam  GENERAL APPEARANCE: healthy, alert and no distress  EYES: EOMI,  PERRL, conjunctiva clear  HENT: ear canals and TM's normal.  Nose and mouth without ulcers, erythema or lesions, there is macular erythematous rah on cheeks bilaterally with some overlying petechial lesions  NECK: supple, nontender, no lymphadenopathy  RESP: lungs clear to auscultation - no rales, rhonchi or wheezes  CV: regular rates and rhythm, normal S1 S2, no murmur noted  SKIN: Erythematous macular and petechial rash as noted in pictures below, other areas of skin including back, lower legs and abdomen were clear.                    ED Course        Procedures               Critical Care time:  none               Results for orders placed or performed during the hospital encounter of 12/10/19   CBC with platelets differential     Status: None   Result Value Ref Range    WBC 8.7 4.0 - 11.0 10e9/L    RBC Count 4.34 3.8 - 5.2 10e12/L    Hemoglobin 13.0 11.7 - 15.7 g/dL    Hematocrit 39.1 35.0 - 47.0 %    MCV 90 78 - 100 fl    MCH 30.0 26.5 - 33.0 pg    MCHC 33.2 31.5 - 36.5 g/dL    RDW 12.2 10.0 - 15.0 %    Platelet Count 280 150 - 450 10e9/L    Diff Method Automated Method     % Neutrophils 75.4 %    % Lymphocytes 17.3 %    % Monocytes 6.0 %    % Eosinophils 0.9 %    % Basophils 0.2 %    % Immature Granulocytes 0.2 %    Nucleated RBCs 0 0 /100    Absolute Neutrophil 6.6 1.6 - 8.3 10e9/L    Absolute Lymphocytes 1.5 0.8 - 5.3 10e9/L    Absolute Monocytes 0.5 0.0 - 1.3 10e9/L    Absolute Eosinophils 0.1 0.0 - 0.7 10e9/L    Absolute Basophils 0.0 0.0 - 0.2 10e9/L    Abs Immature Granulocytes 0.0 0 - 0.4 10e9/L    Absolute Nucleated RBC 0.0    Comprehensive metabolic panel     Status: Abnormal   Result Value Ref Range    Sodium 139 133 - 144 mmol/L    Potassium 3.4 3.4 - 5.3 mmol/L    Chloride 108 94 - 109 mmol/L    Carbon Dioxide 23 20 - 32 mmol/L    Anion Gap 8 3 - 14 " mmol/L    Glucose 77 70 - 99 mg/dL    Urea Nitrogen 11 7 - 30 mg/dL    Creatinine 0.42 (L) 0.52 - 1.04 mg/dL    GFR Estimate >90 >60 mL/min/[1.73_m2]    GFR Estimate If Black >90 >60 mL/min/[1.73_m2]    Calcium 9.4 8.5 - 10.1 mg/dL    Bilirubin Total 0.2 0.2 - 1.3 mg/dL    Albumin 3.0 (L) 3.4 - 5.0 g/dL    Protein Total 7.8 6.8 - 8.8 g/dL    Alkaline Phosphatase 87 40 - 150 U/L    ALT 25 0 - 50 U/L    AST 15 0 - 45 U/L     Medications - No data to display    Assessments & Plan (with Medical Decision Making)     I have reviewed the nursing notes.    I have reviewed the findings, diagnosis, plan and need for follow up with the patient.       Discharge Medication List as of 12/10/2019  6:04 PM        Final diagnoses:   Petechial rash     24-year-old female presents to the urgent care with concern over 2 to 3-day history of pruritic rash on her face is now spreading to the left side of her neck.  She had stable vital signs upon arrival.  Physical exam findings as described above were significant for macular rash on her face with multiple overlying areas of petechia especially on the right cheek, left side of the neck.  It is unclear if petechial lesions are secondary to trauma from itching versus the source of the rash itself.  Combined with recent nosebleeds I did elect to obtain laboratory testing including CBC, CMP which were negative/noncontributory, did intend to obtain coag studies as well however there was issue with and having appropriate specimens in the lab.  Symptoms appear most some sort of contact dermatitis.  Would also consider cold/wind exposure.  She was discharged home stable with instructions for symptomatic treat with OTC antihistamine, cool compresses, topical hydrocortisone as needed.  Follow-up with primary care provider if no improvement within the next 3-5 days.  Worrisome reasons to return to ER/UC sooner discussed.     Disclaimer: This note consists of symbols derived from keyboarding,  dictation, and/or voice recognition software. As a result, there may be errors in the script that have gone undetected.  Please consider this when interpreting information found in the chart.    12/10/2019   [facility] EMERGENCY DEPARTMENT     Alida Fontenot PA-C  12/15/19 1146

## 2020-02-20 ENCOUNTER — OFFICE VISIT (OUTPATIENT)
Dept: NEUROLOGY | Facility: CLINIC | Age: 25
End: 2020-02-20
Payer: OTHER GOVERNMENT

## 2020-02-20 ENCOUNTER — TRANSFERRED RECORDS (OUTPATIENT)
Dept: HEALTH INFORMATION MANAGEMENT | Facility: CLINIC | Age: 25
End: 2020-02-20

## 2020-02-20 VITALS
DIASTOLIC BLOOD PRESSURE: 83 MMHG | HEART RATE: 93 BPM | SYSTOLIC BLOOD PRESSURE: 140 MMHG | WEIGHT: 256 LBS | BODY MASS INDEX: 38.92 KG/M2

## 2020-02-20 DIAGNOSIS — G40.909 RECURRENT SEIZURES (H): Primary | ICD-10-CM

## 2020-02-20 DIAGNOSIS — G40.909 SEIZURE DISORDER (H): ICD-10-CM

## 2020-02-20 DIAGNOSIS — G25.81 RESTLESS LEG SYNDROME: ICD-10-CM

## 2020-02-20 RX ORDER — FOLIC ACID 1 MG/1
2 TABLET ORAL DAILY
Qty: 60 TABLET | Refills: 11 | Status: SHIPPED | OUTPATIENT
Start: 2020-02-20 | End: 2021-10-07

## 2020-02-20 RX ORDER — CARBAMAZEPINE 300 MG/1
CAPSULE, EXTENDED RELEASE ORAL
Qty: 90 CAPSULE | Refills: 11 | Status: SHIPPED | OUTPATIENT
Start: 2020-02-20 | End: 2020-12-28

## 2020-02-20 RX ORDER — ROPINIROLE 0.5 MG/1
0.5 TABLET, FILM COATED ORAL AT BEDTIME
Qty: 90 TABLET | Refills: 3 | Status: SHIPPED | OUTPATIENT
Start: 2020-02-20 | End: 2021-02-17

## 2020-02-20 ASSESSMENT — PAIN SCALES - GENERAL: PAINLEVEL: NO PAIN (0)

## 2020-02-20 NOTE — PROGRESS NOTES
"CC: Follow up for seizures.    HPI: Ms. Tita Evans returns for follow up. She is by herself in the clinic today. She is a 24-year-old, right-handed female with a history of seizures since 2000.  She had seen Dr. Villa from [facility] in the past. Since the last visit,  she had no seizures. Her last seizure was in Dec, 2018 likely due to sleep deprivation and with URI for a few days around that time. She is pregnant and now is 30 weeks, due day is in April. Her most recently Tegretol level in Oct. 2019 was 6.5.  She is currently taking Carbamazepine 300 mg-600 mg.  No side effect reported.  She is compliant with her medications.     She tried Keppra 750 mg bid in the past.  She could not tolerate the keppra.   She was also diagnosed with \"restless leg syndrome\".  She is currently on Requip 05 mg qhs, and it is working very well to control her symptoms.  She also mentioned that exercise helped a lot for her RLS.  If she does not exercise, she will have a lot of problems with RLS.    Her seizures started in 2000. Her typical seizures are described as she has an aura of right arm and leg tingling achy sensation, then followed by right arm and leg shaking arrhythmically which lasts for about 30 seconds to 1 minute. She was started on carbamazepine many years ago, and her seizures were well controlled until 05/2009 when she was trying to wean herself off the carbamazepine, then she started seizure recurrence. She had a series of a generalized tonic-clonic seizures at that time, so she was put back on carbamazepine. Her seizures usually occur in the early morning. In 12/2011, when she was seeing Dr. Villa, he was trying to wean the patient off the carbamazepine again, and in February, after she was off the medication for a week, then she had another simple partial seizure with the right arm and the leg jerking for about 1 minute,   RISK FACTORS FOR SEIZURES: She had no history of head trauma with loss of " consciousness. No history of CNS infections. No history of febrile convulsions. TRIGGERS FOR SEIZURES Unknown.   PAST MEDICAL HISTORY: None.   PAST SURGICAL HISTORY: None.   FAMILY HISTORY: No family history of seizures. Grandmother had a history of diabetes. Aunt had a history of brain hemorrhage. Mother and sister have RLS.    Current Outpatient Medications   Medication Sig Dispense Refill     carBAMazepine (CARBATROL) 300 MG 12 hr capsule TAKE 1 CAPSULE BY MOUTH DAILY IN THE MORNING,  AND 2 CAPSULES DAILY IN THE EVENING 90 capsule 11     folic acid (FOLVITE) 1 MG tablet Take 2 tablets (2 mg) by mouth daily 60 tablet 11     Prenatal Vit-Fe Fumarate-FA (PRENATAL MULTIVITAMIN W/IRON) 27-0.8 MG tablet Take 1 tablet by mouth every evening       rOPINIRole (REQUIP) 0.5 MG tablet TAKE 1 TABLET BY MOUTH AT BEDTIME 90 tablet 3     albuterol (PROAIR HFA/PROVENTIL HFA/VENTOLIN HFA) 108 (90 Base) MCG/ACT inhaler Inhale 2 puffs into the lungs every 6 hours as needed for shortness of breath / dyspnea or wheezing (Patient not taking: Reported on 3/14/2019) 1 Inhaler 0       ALLERGIES: No known drug allergies.   SOCIAL HISTORY: She is single, living with her mother. No smoking, no alcohol, no drug abuse.  She is in college, major in social work.  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  REVIEW OF SYSTEMS: The 10-point review of systems are completely negative except for seizures.   PREVIOUS DIAGNOSTIC TESTING: She had an MRI and EEG studies over 10 years ago. Results are not available at this time.     PHYSICAL EXAMINATION:  Blood pressure (!) 140/83, pulse 93, weight 256 lb (116.1 kg), not currently breastfeeding.    General exam: General Appearance: No acute distress. HEENT: Normocephalic, atraumatic. Neck: Supple. Extremities: No edema, no clubbing, no cyanosis.     Neurologic Exam: Alert and oriented x3. Speech fluent, appropriate. Normal attention. Cranial Nerves: Pupils are equal, round,  reactive to light and accomodation. Extraocular movement intact. No facial weakness or asymmetry. Hearing normal. Motor Exam: Normal. Coordination:no ataxia. Gait and Station: normal.    Labs:    Component      Latest Ref Rng & Units 1/6/2018   Carbamazepine Total Level      4.0 - 12.0 ug/mL 9.7   10, 11 Epoxide Level      0.4 - 4.0 ug/mL 1.4   Free Carbamazepine Level Ug/Ml      0.6 - 4.2 ug/mL 1.8   Free Epoxide Level      0.2 - 2.0 ug/mL 0.6     IMPRESSION:   1. Epilepsy.  She is a 23-year-old, right-handed female with a history of seizures since 2000.  Since the last visit,  she had no seizures. Her last seizure was in Dec, 2018 likely due to sleep deprivation and with URI for a few days around that time. She is pregnant and now is 30 weeks, due day is in April. Her most recently Tegretol level in Oct. 2019 was 6.5.  She is currently taking Carbamazepine 300 mg-600 mg.  No side effect reported.  She is compliant with her medications.     Patient was consulted regarding pregnancy and the possible fetal malformation with carbamazepine.  She voices understanding the the increased risk of fetal malformation with AEDs.  She is planning to get pregnant in about one year.    2. Restless leg syndrome. Improved. Continue on Requip.  She is currently on Requip 05 mg qhs, and it is working very well to control her symptoms.  She also mentioned that exercise helped a lot for her RLS.  If she does not exercise, she will have a lot of problems with RLS.    PLAN:   1. Continue carbamazepine 300 mg in a.m. 600 mg p.m.    2. Continue Requip 0.5 mg qhs.  3. Folic acid 2 mg qd.  4. Check CBZ level and Na. Will target level between 6.5 to 12.  5. RTC in one months.      25 min was spent on the visit.  Over 50% of the time was spent on education, counseling about optimal seizure control and coordination of care.

## 2020-02-20 NOTE — LETTER
"2020     RE: Tita Prieto  : 1995   MRN: [id number]      Dear Colleague,    Thank you for referring your patient, Tita Prieto, to the [facility] at [facility]. Please see a copy of my visit note below.    CC: Follow up for seizures.    HPI: Ms. Tita Evans returns for follow up. She is by herself in the clinic today. She is a 24-year-old, right-handed female with a history of seizures since .  She had seen Dr. Villa from [facility] in the past. Since the last visit,  she had no seizures. Her last seizure was in Dec, 2018 likely due to sleep deprivation and with URI for a few days around that time. She is pregnant and now is 30 weeks, due day is in April. Her most recently Tegretol level in Oct. 2019 was 6.5.  She is currently taking Carbamazepine 300 mg-600 mg.  No side effect reported.  She is compliant with her medications.     She tried Keppra 750 mg bid in the past.  She could not tolerate the keppra.   She was also diagnosed with \"restless leg syndrome\".  She is currently on Requip 05 mg qhs, and it is working very well to control her symptoms.  She also mentioned that exercise helped a lot for her RLS.  If she does not exercise, she will have a lot of problems with RLS.    Her seizures started in . Her typical seizures are described as she has an aura of right arm and leg tingling achy sensation, then followed by right arm and leg shaking arrhythmically which lasts for about 30 seconds to 1 minute. She was started on carbamazepine many years ago, and her seizures were well controlled until 2009 when she was trying to wean herself off the carbamazepine, then she started seizure recurrence. She had a series of a generalized tonic-clonic seizures at that time, so she was put back on carbamazepine. Her seizures usually occur in the early morning. In 2011, when she was seeing Dr. Villa, he was trying to wean the patient " off the carbamazepine again, and in February, after she was off the medication for a week, then she had another simple partial seizure with the right arm and the leg jerking for about 1 minute,   RISK FACTORS FOR SEIZURES: She had no history of head trauma with loss of consciousness. No history of CNS infections. No history of febrile convulsions. TRIGGERS FOR SEIZURES Unknown.   PAST MEDICAL HISTORY: None.   PAST SURGICAL HISTORY: None.   FAMILY HISTORY: No family history of seizures. Grandmother had a history of diabetes. Aunt had a history of brain hemorrhage. Mother and sister have RLS.    Current Outpatient Medications   Medication Sig Dispense Refill     carBAMazepine (CARBATROL) 300 MG 12 hr capsule TAKE 1 CAPSULE BY MOUTH DAILY IN THE MORNING,  AND 2 CAPSULES DAILY IN THE EVENING 90 capsule 11     folic acid (FOLVITE) 1 MG tablet Take 2 tablets (2 mg) by mouth daily 60 tablet 11     Prenatal Vit-Fe Fumarate-FA (PRENATAL MULTIVITAMIN W/IRON) 27-0.8 MG tablet Take 1 tablet by mouth every evening       rOPINIRole (REQUIP) 0.5 MG tablet TAKE 1 TABLET BY MOUTH AT BEDTIME 90 tablet 3     albuterol (PROAIR HFA/PROVENTIL HFA/VENTOLIN HFA) 108 (90 Base) MCG/ACT inhaler Inhale 2 puffs into the lungs every 6 hours as needed for shortness of breath / dyspnea or wheezing (Patient not taking: Reported on 3/14/2019) 1 Inhaler 0       ALLERGIES: No known drug allergies.   SOCIAL HISTORY: She is single, living with her mother. No smoking, no alcohol, no drug abuse.  She is in college, major in social work.  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  REVIEW OF SYSTEMS: The 10-point review of systems are completely negative except for seizures.   PREVIOUS DIAGNOSTIC TESTING: She had an MRI and EEG studies over 10 years ago. Results are not available at this time.     PHYSICAL EXAMINATION:  Blood pressure (!) 140/83, pulse 93, weight 256 lb (116.1 kg), not currently breastfeeding.    General exam:  General Appearance: No acute distress. HEENT: Normocephalic, atraumatic. Neck: Supple. Extremities: No edema, no clubbing, no cyanosis.     Neurologic Exam: Alert and oriented x3. Speech fluent, appropriate. Normal attention. Cranial Nerves: Pupils are equal, round, reactive to light and accomodation. Extraocular movement intact. No facial weakness or asymmetry. Hearing normal. Motor Exam: Normal. Coordination:no ataxia. Gait and Station: normal.    Labs:    Component      Latest Ref Rng & Units 1/6/2018   Carbamazepine Total Level      4.0 - 12.0 ug/mL 9.7   10, 11 Epoxide Level      0.4 - 4.0 ug/mL 1.4   Free Carbamazepine Level Ug/Ml      0.6 - 4.2 ug/mL 1.8   Free Epoxide Level      0.2 - 2.0 ug/mL 0.6     IMPRESSION:   1. Epilepsy.  She is a 23-year-old, right-handed female with a history of seizures since 2000.  Since the last visit,  she had no seizures. Her last seizure was in Dec, 2018 likely due to sleep deprivation and with URI for a few days around that time. She is pregnant and now is 30 weeks, due day is in April. Her most recently Tegretol level in Oct. 2019 was 6.5.  She is currently taking Carbamazepine 300 mg-600 mg.  No side effect reported.  She is compliant with her medications.     Patient was consulted regarding pregnancy and the possible fetal malformation with carbamazepine.  She voices understanding the the increased risk of fetal malformation with AEDs.  She is planning to get pregnant in about one year.    2. Restless leg syndrome. Improved. Continue on Requip.  She is currently on Requip 05 mg qhs, and it is working very well to control her symptoms.  She also mentioned that exercise helped a lot for her RLS.  If she does not exercise, she will have a lot of problems with RLS.    PLAN:   1. Continue carbamazepine 300 mg in a.m. 600 mg p.m.    2. Continue Requip 0.5 mg qhs.  3. Folic acid 2 mg qd.  4. Check CBZ level and Na. Will target level between 6.5 to 12.  5. RTC in one  months.    25 min was spent on the visit.  Over 50% of the time was spent on education, counseling about optimal seizure control and coordination of care.    Again, thank you for allowing me to participate in the care of your patient.      Sincerely,    Maris Otero MD

## 2020-03-25 ENCOUNTER — TRANSFERRED RECORDS (OUTPATIENT)
Dept: HEALTH INFORMATION MANAGEMENT | Facility: CLINIC | Age: 25
End: 2020-03-25

## 2020-04-06 ENCOUNTER — TELEPHONE (OUTPATIENT)
Dept: NEUROLOGY | Facility: CLINIC | Age: 25
End: 2020-04-06

## 2020-04-06 DIAGNOSIS — G40.909 RECURRENT SEIZURES (H): Primary | ICD-10-CM

## 2020-04-06 DIAGNOSIS — G40.909 SEIZURE DISORDER (H): ICD-10-CM

## 2020-04-06 RX ORDER — CARBAMAZEPINE 100 MG/1
100 CAPSULE, EXTENDED RELEASE ORAL DAILY
Qty: 30 CAPSULE | Refills: 2 | Status: SHIPPED | OUTPATIENT
Start: 2020-04-06 | End: 2021-04-01

## 2020-04-06 RX ORDER — CARBAMAZEPINE 100 MG/1
CAPSULE, EXTENDED RELEASE ORAL
Qty: 360 CAPSULE | Refills: 0 | Status: CANCELLED | OUTPATIENT
Start: 2020-04-06

## 2020-04-06 NOTE — TELEPHONE ENCOUNTER
Discussed with Dr. Otero,    Tita calls today to report her Carbamazepine level has returned at 5.7.    Dr. Otero recommended initiating an additional Carbamazepine 100 mg tab QAM. Total dose will be 400 mg AM and 600 mg PM..    This information was discussed with Tita who is agreeable to the plan.    Prescription placed and sent to Pharmacy.

## 2020-04-06 NOTE — TELEPHONE ENCOUNTER
Tegretol level returned at 5.7.  Dr. Otero said if her levels dropped below 6.5 they would make a medication change.

## 2020-04-06 NOTE — TELEPHONE ENCOUNTER
What is the concern that needs to be addressed by a nurse? Pt recently got a level change back from the lab and she was told that a nurse was going to call her about this. Lab was for Carbamazepine.     May a detailed message be left on voicemail? yes    Date of last office visit:     Message routed to: RN Pool

## 2020-05-08 ENCOUNTER — TELEPHONE (OUTPATIENT)
Dept: NEUROLOGY | Facility: CLINIC | Age: 25
End: 2020-05-08

## 2020-05-08 NOTE — TELEPHONE ENCOUNTER
Outreach call placed to the patient to check in regarding her epilepsy care. She had a medication dose change 4 weeks ago due to low blood level.

## 2020-05-12 NOTE — TELEPHONE ENCOUNTER
"[facility] Nurse Note     Tita     5/12/2020     Primary Epileptologist: Maris Otero M.D.  Primary Epilepsy Nurse Coordinator: Mak Roach RN  Expected date of delivery: Delivered 4/27/2020  OB/GYN contact information:                [facility]                     Epilepsy Data     Last tonic-clonic seizure: 2018  Frequency of seizures pre-conception: rare  Pre pregnancy AED and doses: -600.      Medications:              Target AED level during pregnancy: CBZ 6.5-12              Use of folic acid daily: yes              Use of prenatal vitamins daily:  yes                  Postpartum AED reduction plan: being assessed now     Medication AM DOSE PM DOSE DAILY DOSE AED GOAL LEVEL LAST LAB DRAW DATE LAB RESULT    [id number] 6.5-12 3/31/2020 5.7 (med increased since then)          Assessment: No seizures. No side effects. Denies unsteadiness, nausea. Her baby is 2 weeksold, girl, \"Terri\". We reviewed that sleep deprivation and stress can affect seizure control. She feels that her baby is a good sleeper and her  is helping her meet the babies needs. I advised that we usually will keep her anti seizure medication at this level after delivery to provide extra coverage during the post partum period, but that if she develops side effects that she should call us for a medication dose change. She is agreeable to this plan.        Next Contact:   Mak Roach RN, BSN, PHN, CNRN       "

## 2020-05-15 NOTE — TELEPHONE ENCOUNTER
Dr. Otero requests a follow up in July or August 2020. No new orders.    I will SWITCH the dose or number of times a day I take the medications listed below when I get home from the hospital:  None

## 2020-09-09 ENCOUNTER — TELEPHONE (OUTPATIENT)
Dept: NEUROLOGY | Facility: CLINIC | Age: 25
End: 2020-09-09

## 2020-09-17 NOTE — TELEPHONE ENCOUNTER
DMV form completed ,fax and mailed to [employer], form fax to [facility],mailed to patient and copy placed in folder.

## 2020-11-05 ENCOUNTER — VIRTUAL VISIT (OUTPATIENT)
Dept: FAMILY MEDICINE | Facility: OTHER | Age: 25
End: 2020-11-05

## 2020-11-05 NOTE — PROGRESS NOTES
"Date: 2020 10:17:19  Clinician: Qasim Atkinson  Clinician NPI: [id number]  Patient: Tita Prieto  Patient : 1995  Patient Address: [address] [place] [address]  Patient Phone: [phone]  Visit Protocol: URI  Patient Summary:  Tita is a 25 year old ( : 1995 ) female who initiated a [facility] Visit for COVID-19 (Coronavirus) evaluation and screening. When asked the question \"Please sign me up to receive news, health information and promotions. \", Tita responded \"No\".    When asked when her symptoms started, Tita reported that she does not have any symptoms.   She denies taking antibiotic medication in the past month and having recent facial or sinus surgery in the past 60 days.    Pertinent COVID-19 (Coronavirus) information  Tita does not work or volunteer as healthcare worker or a . In the past 14 days, Tita has not worked or volunteered at a healthcare facility or group living setting.   In the past 14 days, she also has not lived in a congregate living setting.   Tita has not had a close contact with a laboratory-confirmed COVID-19 patient within the last 14 days.    Since 2019, Tita has been tested for COVID-19 and has not had upper respiratory infection or influenza-like illness.      Result of COVID-19 test: Negative     Date of her COVID-19 test: 2020      Pertinent medical history  Tita does not get yeast infections when she takes antibiotics.   Tita does not need a return to work/school note.   Weight: 200 lbs   Tita does not smoke or use smokeless tobacco.   She denies pregnancy and denies breastfeeding. She has menstruated in the past month.   Additional information as reported by the patient (free text): Previously diagnosed with Guillain-Kite a few years ago maybe 2018. My daughter who lives with me is 6 month old   Weight: 200 lbs    MEDICATIONS: Requip oral, sertraline oral, carbamazepine oral, ALLERGIES: NKDA  Clinician " Response:  Dear Tita,   Your symptoms show that you may have coronavirus (COVID-19). This illness can cause fever, cough and trouble breathing. Many people get a mild case and get better on their own. Some people can get very sick.  What should I do?  We would like to test you for this virus.   1. Please call [phone] to schedule your visit. Explain that you were referred by [facility] to have a COVID-19 test. Be ready to share your [facility] visit ID number.  * If you need to schedule in [facility] please call [phone] or for [employer] employees please call [phone].  * If you need to schedule in the [place] area please call [phone]. [employer] employees call [phone].  The following will serve as your written order for this COVID Test, ordered by me, for the indication of suspected COVID [Z20.828]: The test will be ordered in [employer], our electronic health record, after you are scheduled. It will show as ordered and authorized by Tate Cui MD.  Order: COVID-19 (Coronavirus) PCR for SYMPTOMATIC testing from [facility].   2. When it's time for your COVID test:  Stay at least 6 feet away from others. (If someone will drive you to your test, stay in the backseat, as far away from the  as you can.)   Cover your mouth and nose with a mask, tissue or washcloth.  Go straight to the testing site. Don't make any stops on the way there or back.      3.Starting now: Stay home and away from others (self-isolate) until:   You've had no fever---and no medicine that reduces fever---for one full day (24 hours). And...   Your other symptoms have gotten better. For example, your cough or breathing has improved. And...   At least 10 days have passed since your symptoms started.       During this time, don't leave the house except for testing or medical care.   Stay in your own room, even for meals. Use your own bathroom if you can.   Stay away from others in your home. No hugging, kissing or shaking hands. No visitors.   "Don't go to work, school or anywhere else.    Clean \"high touch\" surfaces often (doorknobs, counters, handles, etc.). Use a household cleaning spray or wipes. You'll find a full list of  on the EPA website: www.epa.gov/pesticide-registration/list-n-disinfectants-use-against-sars-cov-2.   Cover your mouth and nose with a mask, tissue or washcloth to avoid spreading germs.  Wash your hands and face often. Use soap and water.  Caregivers in these groups are at risk for severe illness due to COVID-19:  o People 65 years and older  o People who live in a nursing home or long-term care facility  o People with chronic disease (lung, heart, cancer, diabetes, kidney, liver, immunologic)  o People who have a weakened immune system, including those who:   Are in cancer treatment  Take medicine that weakens the immune system, such as corticosteroids  Had a bone marrow or organ transplant  Have an immune deficiency  Have poorly controlled HIV or AIDS  Are obese (body mass index of 40 or higher)  Smoke regularly   o Caregivers should wear gloves while washing dishes, handling laundry and cleaning bedrooms and bathrooms.  o Use caution when washing and drying laundry: Don't shake dirty laundry, and use the warmest water setting that you can.  o For more tips, go to www.cdc.gov/coronavirus/2019-ncov/downloads/10Things.pdf.       How can I take care of myself?   Get lots of rest. Drink extra fluids (unless a doctor has told you not to).   Take Tylenol (acetaminophen) for fever or pain. If you have liver or kidney problems, ask your family doctor if it's okay to take Tylenol.   Adults can take either:    650 mg (two 325 mg pills) every 4 to 6 hours, or...   1,000 mg (two 500 mg pills) every 8 hours as needed.    Note: Don't take more than 3,000 mg in one day. Acetaminophen is found in many medicines (both prescribed and over-the-counter medicines). Read all labels to be sure you don't take too much.   For children, check the " Tylenol bottle for the right dose. The dose is based on the child's age or weight.    If you have other health problems (like cancer, heart failure, an organ transplant or severe kidney disease): Call your specialty clinic if you don't feel better in the next 2 days.       Know when to call 911. Emergency warning signs include:    Trouble breathing or shortness of breath Pain or pressure in the chest that doesn't go away Feeling confused like you haven't felt before, or not being able to wake up Bluish-colored lips or face.  Where can I get more information?   [facility] -- About COVID-19: [email or web address]   CDC -- What to Do If You're Sick: www.cdc.gov/coronavirus/2019-ncov/about/steps-when-sick.html   [facility] -- Ending Home Isolation: www.cdc.gov/coronavirus/2019-ncov/hcp/disposition-in-home-patients.html   [facility] -- Caring for Someone: www.cdc.gov/coronavirus/2019-ncov/if-you-are-sick/care-for-someone.html   [facility] -- Interim Guidance for Hospital Discharge to Home: www.[facility].mn.us/diseases/coronavirus/hcp/hospdischarge.pdf   [facility] clinical trials (COVID-19 research studies): clinicalaffairs.[facility]-clinical-trials    Below are the COVID-19 hotlines at the [place] Department of [facility] ([facility]). Interpreters are available.    For health questions: Call 589-220-8842 or 1-430.235.4034 (7 a.m. to 7 p.m.) For questions about schools and childcare: Call 444-684-4307 or 1-592.426.7792 (7 a.m. to 7 p.m.)    Diagnosis: Contact with and (suspected) exposure to other viral communicable diseases  Diagnosis ICD: Z20.828

## 2020-12-22 DIAGNOSIS — G40.909 SEIZURE DISORDER (H): ICD-10-CM

## 2020-12-28 RX ORDER — CARBAMAZEPINE 300 MG/1
CAPSULE, EXTENDED RELEASE ORAL
Qty: 90 CAPSULE | Refills: 11 | Status: SHIPPED | OUTPATIENT
Start: 2020-12-28 | End: 2021-04-01

## 2020-12-28 NOTE — TELEPHONE ENCOUNTER
carBAMazepine 300 MG PO 12 hr capsule   Last Written Prescription Date: 2/20/20  Last Fill Quantity: 90,   # refills: 11  Last Office Visit : 2/20/20  Future Office visit:  None  aed  3/20  [facility] 12/19    Warnings Override History for carBAMazepine 300 MG PO 12 hr capsule [[id number]]    Overridden by Maris Otero MD on Feb 20, 2020 10:00 AM   Pregnancy Warning   1. CARBAMAZEPINE: NOT RECOMMENDED FOR PREGNANCY [Level: Not recommended]

## 2020-12-29 ENCOUNTER — TELEPHONE (OUTPATIENT)
Dept: NEUROLOGY | Facility: CLINIC | Age: 25
End: 2020-12-29

## 2020-12-29 NOTE — TELEPHONE ENCOUNTER
Call placed to the pharmacy to follow up on this. They provided a partial fill to the patient already and ordered the remainder of the supply. This was communicated to the patient.

## 2020-12-29 NOTE — TELEPHONE ENCOUNTER
Patient call because her pharmacy did not receive the carbamazepine RX , on our end it state the received, please advise.

## 2021-03-16 DIAGNOSIS — G25.81 RESTLESS LEG SYNDROME: ICD-10-CM

## 2021-03-20 NOTE — TELEPHONE ENCOUNTER
rOPINIRole (REQUIP) 0.5 MG tablet  Last Written Prescription Date:  2/17/21  Last Fill Quantity: 30,   # refills: 0  Last Office Visit : 2/20/20  Future Office visit:  None    RTC in one months.    Scheduling has been notified to contact the pt for appointment.        Routing refill request to provider for review/approval because: not on protocol. Past due for 1 mth appt.

## 2021-03-21 RX ORDER — ROPINIROLE 0.5 MG/1
0.5 TABLET, FILM COATED ORAL AT BEDTIME
Qty: 30 TABLET | Refills: 0 | Status: SHIPPED | OUTPATIENT
Start: 2021-03-21 | End: 2021-04-01

## 2021-03-22 ENCOUNTER — TELEPHONE (OUTPATIENT)
Dept: NEUROLOGY | Facility: CLINIC | Age: 26
End: 2021-03-22

## 2021-04-01 ENCOUNTER — VIRTUAL VISIT (OUTPATIENT)
Dept: NEUROLOGY | Facility: CLINIC | Age: 26
End: 2021-04-01
Payer: OTHER GOVERNMENT

## 2021-04-01 DIAGNOSIS — G25.81 RESTLESS LEG SYNDROME: ICD-10-CM

## 2021-04-01 DIAGNOSIS — G40.909 SEIZURE DISORDER (H): ICD-10-CM

## 2021-04-01 RX ORDER — CARBAMAZEPINE 300 MG/1
CAPSULE, EXTENDED RELEASE ORAL
Qty: 90 CAPSULE | Refills: 11 | Status: SHIPPED | OUTPATIENT
Start: 2021-04-01 | End: 2021-10-07

## 2021-04-01 RX ORDER — ROPINIROLE 0.5 MG/1
0.5 TABLET, FILM COATED ORAL AT BEDTIME
Qty: 30 TABLET | Refills: 11 | Status: SHIPPED | OUTPATIENT
Start: 2021-04-01 | End: 2021-10-07

## 2021-04-01 NOTE — PROGRESS NOTES
"CC: Follow up for seizures.     HPI: Video call  for follow up.  She is a 24-year-old, right-handed female with a history of seizures since 2000.  [facility] had [facility]. Since the last visit about one year ago,  she had no seizures. Her last seizure was in Dec, 2018 likely due to sleep deprivation and with URI for a few days around that time. She had her daughter about one year ago, baby was healthy, no issues. Her most recently Tegretol level in June 2020 was 10.1.  She is currently taking Carbamazepine 300 mg-600 mg.  No side effect reported.  She is compliant with her medications.      She tried Keppra 750 mg bid in the past.  She could not tolerate the keppra.   She was also diagnosed with \"restless leg syndrome\".  She is currently on Requip 05 mg qhs, and it is working very well to control her symptoms.  She also mentioned that exercise helped a lot for her RLS.  If she does not exercise, she will have a lot of problems with RLS.     Her seizures started in 2000. Her typical seizures are described as she has an aura of right arm and leg tingling achy sensation, then followed by right arm and leg shaking arrhythmically which lasts for about 30 seconds to 1 minute. She was started on carbamazepine many years ago, and her seizures were well controlled until 05/2009 when she was trying to wean herself off the carbamazepine, then she started seizure recurrence. She had a series of a generalized tonic-clonic seizures at that time, so she was put back on carbamazepine. Her seizures usually occur in the early morning. In 12/2011, when she was seeing Dr. Villa, he was trying to wean the patient off the carbamazepine again, and in February, after she was off the medication for a week, then she had another simple partial seizure with the right arm and the leg jerking for about 1 minute,   RISK FACTORS FOR SEIZURES: She had no history of head trauma with loss of consciousness. No " history of CNS infections. No history of febrile convulsions.    TRIGGERS FOR SEIZURES Unknown.   PAST MEDICAL HISTORY: None.   PAST SURGICAL HISTORY: None.   FAMILY HISTORY: No family history of seizures. Grandmother had a history of diabetes. Aunt had a history of brain hemorrhage. Mother and sister have RLS.       Current Outpatient Medications   Medication Sig Dispense Refill     carBAMazepine (CARBATROL) 100 MG 12 hr capsule Take 1 capsule (100 mg) by mouth daily 30 capsule 2     carBAMazepine (CARBATROL) 300 MG 12 hr capsule TAKE 1 CAPSULE BY MOUTH ONCE DAILY IN THE MORNING AND 2 CAPSULES ONCE DAILY IN THE EVENING. DO NOT TAKE IF PREGNANT 90 capsule 11     rOPINIRole (REQUIP) 0.5 MG tablet Take 1 tablet (0.5 mg) by mouth At Bedtime Call clinic to schedule follow up appointment. 30 tablet 0     sertraline (ZOLOFT) 50 MG tablet 50 mg       albuterol (PROAIR HFA/PROVENTIL HFA/VENTOLIN HFA) 108 (90 Base) MCG/ACT inhaler Inhale 2 puffs into the lungs every 6 hours as needed for shortness of breath / dyspnea or wheezing (Patient not taking: Reported on 3/14/2019) 1 Inhaler 0     folic acid 1 MG PO tablet Take 2 tablets (2 mg) by mouth daily (Patient not taking: Reported on 4/1/2021) 60 tablet 11     Prenatal Vit-Fe Fumarate-FA (PRENATAL MULTIVITAMIN W/IRON) 27-0.8 MG tablet Take 1 tablet by mouth every evening           ALLERGIES: No known drug allergies.   SOCIAL HISTORY: She is single, living with her mother. No smoking, no alcohol, no drug abuse.  She is in college, major in social work.  She is staying home taking care of her child.    REVIEW OF SYSTEMS: Negative.  PREVIOUS DIAGNOSTIC TESTING: She had an MRI and EEG studies over 10 years ago. Results are not available at this time.      PHYSICAL EXAMINATION:    AAO x 3, speech fluent and appropriate. No facial weakness, hearing grossly normal.     Labs:     Component      Latest Ref Rng & Units 1/6/2018   Carbamazepine Total Level      4.0 - 12.0 ug/mL 9.7   10,  11 Epoxide Level      0.4 - 4.0 ug/mL 1.4   Free Carbamazepine Level Ug/Ml      0.6 - 4.2 ug/mL 1.8   Free Epoxide Level      0.2 - 2.0 ug/mL 0.6      IMPRESSION:   1. Epilepsy.      Since the last visit about one year ago,  she had no seizures. Her last seizure was in Dec, 2018 likely due to sleep deprivation and with URI for a few days around that time. She had her daughter about one year ago, baby was healthy, no issues. Her most recently Tegretol level in June 2020 was 10.1.  She is currently taking Carbamazepine 300 mg-600 mg.  No side effect reported.  She is compliant with her medications.      2. Restless leg syndrome. Improved. Continue on Requip.  She is currently on Requip 05 mg qhs, and it is working very well to control her symptoms.  She also mentioned that exercise helped a lot for her RLS.  If she does not exercise, she will have a lot of problems with RLS.     PLAN:   1. Continue carbamazepine 300 mg in a.m. 600 mg p.m.    2. Continue Requip 0.5 mg qhs.  3. Folic acid 2 mg qd.  4. RTC in 6 months.      27 min total time was spent on this visit.      17 min was spent on face to face time  5 min was spent on preparation of visit to review charts and labs, ordering medications and tests  5 min was spent on documentation of clinical information

## 2021-04-01 NOTE — PATIENT INSTRUCTIONS
Times of Days am  pm        Medication Tablet Size Number of Tablets/Capsules Total Daily Dosage    Tegretol 300 1 2       900 mg    Requip 0.5  1       0.5 mg                                                                                                                 Carry this with you at all times.  CONTINUE TAKING YOUR OTHER MEDICATIONS AS PREVIOUSLY DIRECTED.      * * *Do not store medications in the bathroom.  Keep medications away from children!* * *

## 2021-04-01 NOTE — PROGRESS NOTES
Tita is a 25 year old who is being evaluated via a billable video visit.      How would you like to obtain your AVS? Mail a copy  If the video visit is dropped, the invitation should be resent by: Send to e-mail at: [email or web address]     Will anyone else be joining your video visit? No      Video Start Time: 8:56 AM  Video-Visit Details    Type of service:  Video Visit    Video End Time:9:13 AM    Originating Location (pt. Location): Home    Distant Location (provider location):  [facility]     Platform used for Video Visit: Ling

## 2021-04-01 NOTE — LETTER
"2021       RE: Tita Prieto  : 1995   MRN: [id number]      Dear Colleague,    Thank you for referring your patient, Tita Prieto, to the [facility] at [facility]. Please see a copy of my visit note below.    Tita is a 25 year old who is being evaluated via a billable video visit.      How would you like to obtain your AVS? Mail a copy  If the video visit is dropped, the invitation should be resent by: Send to e-mail at: [email or web address]     Will anyone else be joining your video visit? No      Video Start Time: 8:56 AM  Video-Visit Details    Type of service:  Video Visit    Video End Time:9:13 AM    Originating Location (pt. Location): Home    Distant Location (provider location):  [facility]     Platform used for Video Visit: [employer]ulse      CC: Follow up for seizures.     HPI: Video call  for follow up.  She is a 24-year-old, right-handed female with a history of seizures since .  She had [facility]. Since the last visit about one year ago,  she had no seizures. Her last seizure was in Dec, 2018 likely due to sleep deprivation and with URI for a few days around that time. She had her daughter about one year ago, baby was healthy, no issues. Her most recently Tegretol level in 2020 was 10.1.  She is currently taking Carbamazepine 300 mg-600 mg.  No side effect reported.  She is compliant with her medications.      She tried Keppra 750 mg bid in the past.  She could not tolerate the keppra.   She was also diagnosed with \"restless leg syndrome\".  She is currently on Requip 05 mg qhs, and it is working very well to control her symptoms.  She also mentioned that exercise helped a lot for her RLS.  If she does not exercise, she will have a lot of problems with RLS.     Her seizures started in . Her typical seizures are described as she has an aura of right arm and leg tingling " achy sensation, then followed by right arm and leg shaking arrhythmically which lasts for about 30 seconds to 1 minute. She was started on carbamazepine many years ago, and her seizures were well controlled until 05/2009 when she was trying to wean herself off the carbamazepine, then she started seizure recurrence. She had a series of a generalized tonic-clonic seizures at that time, so she was put back on carbamazepine. Her seizures usually occur in the early morning. In 12/2011, when she was seeing Dr. Villa, he was trying to wean the patient off the carbamazepine again, and in February, after she was off the medication for a week, then she had another simple partial seizure with the right arm and the leg jerking for about 1 minute,   RISK FACTORS FOR SEIZURES: She had no history of head trauma with loss of consciousness. No history of CNS infections. No history of febrile convulsions.    TRIGGERS FOR SEIZURES Unknown.   PAST MEDICAL HISTORY: None.   PAST SURGICAL HISTORY: None.   FAMILY HISTORY: No family history of seizures. Grandmother had a history of diabetes. Aunt had a history of brain hemorrhage. Mother and sister have RLS.       Current Outpatient Medications   Medication Sig Dispense Refill     carBAMazepine (CARBATROL) 100 MG 12 hr capsule Take 1 capsule (100 mg) by mouth daily 30 capsule 2     carBAMazepine (CARBATROL) 300 MG 12 hr capsule TAKE 1 CAPSULE BY MOUTH ONCE DAILY IN THE MORNING AND 2 CAPSULES ONCE DAILY IN THE EVENING. DO NOT TAKE IF PREGNANT 90 capsule 11     rOPINIRole (REQUIP) 0.5 MG tablet Take 1 tablet (0.5 mg) by mouth At Bedtime Call clinic to schedule follow up appointment. 30 tablet 0     sertraline (ZOLOFT) 50 MG tablet 50 mg       albuterol (PROAIR HFA/PROVENTIL HFA/VENTOLIN HFA) 108 (90 Base) MCG/ACT inhaler Inhale 2 puffs into the lungs every 6 hours as needed for shortness of breath / dyspnea or wheezing (Patient not taking: Reported on 3/14/2019) 1 Inhaler 0     folic acid 1  MG PO tablet Take 2 tablets (2 mg) by mouth daily (Patient not taking: Reported on 4/1/2021) 60 tablet 11     Prenatal Vit-Fe Fumarate-FA (PRENATAL MULTIVITAMIN W/IRON) 27-0.8 MG tablet Take 1 tablet by mouth every evening           ALLERGIES: No known drug allergies.   SOCIAL HISTORY: She is single, living with her mother. No smoking, no alcohol, no drug abuse.  She is in college, major in social work.  She is staying home taking care of her child.    REVIEW OF SYSTEMS: Negative.  PREVIOUS DIAGNOSTIC TESTING: She had an MRI and EEG studies over 10 years ago. Results are not available at this time.      PHYSICAL EXAMINATION:    AAO x 3, speech fluent and appropriate. No facial weakness, hearing grossly normal.     Labs:     Component      Latest Ref Rng & Units 1/6/2018   Carbamazepine Total Level      4.0 - 12.0 ug/mL 9.7   10, 11 Epoxide Level      0.4 - 4.0 ug/mL 1.4   Free Carbamazepine Level Ug/Ml      0.6 - 4.2 ug/mL 1.8   Free Epoxide Level      0.2 - 2.0 ug/mL 0.6      IMPRESSION:   1. Epilepsy.      Since the last visit about one year ago,  she had no seizures. Her last seizure was in Dec, 2018 likely due to sleep deprivation and with URI for a few days around that time. She had her daughter about one year ago, baby was healthy, no issues. Her most recently Tegretol level in June 2020 was 10.1.  She is currently taking Carbamazepine 300 mg-600 mg.  No side effect reported.  She is compliant with her medications.      2. Restless leg syndrome. Improved. Continue on Requip.  She is currently on Requip 05 mg qhs, and it is working very well to control her symptoms.  She also mentioned that exercise helped a lot for her RLS.  If she does not exercise, she will have a lot of problems with RLS.     PLAN:   1. Continue carbamazepine 300 mg in a.m. 600 mg p.m.    2. Continue Requip 0.5 mg qhs.  3. Folic acid 2 mg qd.  4. RTC in 6 months.      27 min total time was spent on this visit.      17 min was spent on face  to face time  5 min was spent on preparation of visit to review charts and labs, ordering medications and tests  5 min was spent on documentation of clinical information      Maris Otero MD

## 2021-06-19 ENCOUNTER — DOCUMENTATION ONLY (OUTPATIENT)
Dept: OBGYN | Facility: CLINIC | Age: 26
End: 2021-06-19

## 2021-06-19 ENCOUNTER — HOSPITAL ENCOUNTER (EMERGENCY)
Facility: CLINIC | Age: 26
Discharge: HOME | End: 2021-06-19
Attending: PHYSICIAN ASSISTANT | Admitting: PHYSICIAN ASSISTANT
Payer: OTHER GOVERNMENT

## 2021-06-19 VITALS
BODY MASS INDEX: 38.77 KG/M2 | DIASTOLIC BLOOD PRESSURE: 85 MMHG | OXYGEN SATURATION: 96 % | RESPIRATION RATE: 16 BRPM | TEMPERATURE: 98 F | HEART RATE: 89 BPM | SYSTOLIC BLOOD PRESSURE: 159 MMHG | WEIGHT: 255 LBS

## 2021-06-19 DIAGNOSIS — Z32.01 POSITIVE PREGNANCY TEST: ICD-10-CM

## 2021-06-19 DIAGNOSIS — Z30.46 NEXPLANON REMOVAL: ICD-10-CM

## 2021-06-19 LAB — B-HCG SERPL-ACNC: 55 IU/L (ref 0–5)

## 2021-06-19 PROCEDURE — 99203 OFFICE O/P NEW LOW 30 MIN: CPT | Mod: 25 | Performed by: OBSTETRICS & GYNECOLOGY

## 2021-06-19 PROCEDURE — 99214 OFFICE O/P EST MOD 30 MIN: CPT | Performed by: PHYSICIAN ASSISTANT

## 2021-06-19 PROCEDURE — G0463 HOSPITAL OUTPT CLINIC VISIT: HCPCS | Performed by: PHYSICIAN ASSISTANT

## 2021-06-19 PROCEDURE — 36415 COLL VENOUS BLD VENIPUNCTURE: CPT | Performed by: PHYSICIAN ASSISTANT

## 2021-06-19 PROCEDURE — 84702 CHORIONIC GONADOTROPIN TEST: CPT | Performed by: PHYSICIAN ASSISTANT

## 2021-06-19 PROCEDURE — 11982 REMOVE DRUG IMPLANT DEVICE: CPT | Performed by: OBSTETRICS & GYNECOLOGY

## 2021-06-19 NOTE — ED PROVIDER NOTES
History     Chief Complaint   Patient presents with     Pregnancy Test     HPI  Tita Prieto is a 25 year old female who presents requesting a serum pregnancy test.  Patient states she typically gets regular periods and currently has a Nexplanon in place.  Her LMP was 2021.  Patient states she had a little bit of light spotting today but no associated pelvic or abdominal pain.  Patient has had 1 prior pregnancy and has healthy daughter.  She states she took at home pregnancy test today and all 6 were positive.  She went to an outside urgent care and had a negative urine pregnancy test at that time.  She states they recommended she come to a facility in order to get a serum pregnancy test obtained.  Patient denies any pregnancy symptoms.  Denies fevers, chills, nausea, vomiting, diarrhea, abdominal pain, chest pain, shortness of breath, or urinary symptoms.      Allergies:  Allergies   Allergen Reactions     Influenza Vaccines Other (See Comments)     Has history of Guillain [place] syndrome     Shellfish-Derived Products Other (See Comments)     Mouth tingles       Problem List:    Patient Active Problem List    Diagnosis Date Noted     Seizure (H) 12/10/2018     Priority: Medium     Morbid obesity with BMI of 40.0-44.9, adult (H) 2017     Priority: Medium     Irritable bowel syndrome with diarrhea 2017     Priority: Medium     Overview:   2017: Fiber       Guillain Barré syndrome (H) 2016     Priority: Medium     Overview:   ?? 2015 [facility] in [place] hospital visit. Avoiding flu vaccines.       Hand weakness 2015     Priority: Medium     Contraception 2015     Priority: Medium     Overview:   Nexplanon placed 2015       Mixed hyperlipidemia 2014     Priority: Medium     Overview:     Last Lipids:  Last Lipids:  Chol: 2016 207   101  HDL: 2016 49   LDL: 2016 138  LDL DIRECT: . No results found in past 5 years       Restless  legs syndrome (RLS) 03/08/2013     Priority: Medium     Overview:   7/13: Started on Requip  She is on the 0.25mg at bedtime/night and is able to get 6 hours of sleep.       Seizure disorder (H) 03/08/2013     Priority: Medium     Overview:   Carbamezapine 300mg am and 600mg pm.   Sees neurologist  Last seizure episode- 10/2015          Past Medical History:    Past Medical History:   Diagnosis Date     Seizures (H)        Past Surgical History:    Past Surgical History:   Procedure Laterality Date     SURGICAL HISTORY OF -   06/03/1994    PE tubes       Family History:    Family History   Problem Relation Age of Onset     No Known Problems Mother      No Known Problems Father        Social History:  Marital Status:   [2]  Social History     Tobacco Use     Smoking status: Never Smoker     Smokeless tobacco: Never Used   Substance Use Topics     Alcohol use: No     Drug use: No        Medications:    carBAMazepine (CARBATROL) 300 MG 12 hr capsule  folic acid 1 MG PO tablet  Prenatal Vit-Fe Fumarate-FA (PRENATAL MULTIVITAMIN W/IRON) 27-0.8 MG tablet  rOPINIRole (REQUIP) 0.5 MG tablet  sertraline (ZOLOFT) 50 MG tablet          Review of Systems   Constitutional: Negative.    Gastrointestinal: Negative for abdominal pain, nausea and vomiting.   Genitourinary: Negative for pelvic pain.        Very mild spotting   Musculoskeletal: Negative.    Skin: Negative.    All other systems reviewed and are negative.      Physical Exam   BP: (!) 159/85  Pulse: 89  Temp: 98  F (36.7  C)  Resp: 16  Weight: 115.7 kg (255 lb)  SpO2: 96 %      Physical Exam  Constitutional:       General: She is not in acute distress.     Appearance: Normal appearance. She is not ill-appearing, toxic-appearing or diaphoretic.   HENT:      Head: Normocephalic and atraumatic.   Eyes:      Conjunctiva/sclera: Conjunctivae normal.   Neck:      Musculoskeletal: Neck supple.   Cardiovascular:      Rate and Rhythm: Normal rate and regular rhythm.       Heart sounds: Normal heart sounds.   Pulmonary:      Effort: Pulmonary effort is normal.      Breath sounds: Normal breath sounds.   Abdominal:      Palpations: Abdomen is soft.      Tenderness: There is no abdominal tenderness.   Musculoskeletal: Normal range of motion.   Skin:     General: Skin is warm and dry.      Findings: No rash.   Neurological:      Mental Status: She is alert.         ED Course        Procedures    No results found for this or any previous visit (from the past 24 hour(s)).     Results for orders placed or performed during the hospital encounter of 06/19/21   HCG quantitative pregnancy     Status: Abnormal   Result Value Ref Range    HCG Quantitative Serum 55 (H) 0 - 5 IU/L       Medications - No data to display    Assessments & Plan (with Medical Decision Making)     Pt is a 25 year old female who presents requesting a serum pregnancy test.  Patient states she typically gets regular periods and currently has a Nexplanon in place.  Her LMP was 4/27/2021.  Patient states she had a little bit of light spotting today but no associated pelvic or abdominal pain.  Patient has had 1 prior pregnancy and has healthy daughter.  She states she took at home pregnancy test today and all 6 were positive.  She went to an outside urgent care and had a negative urine pregnancy test at that time.  She states they recommended she come to a facility in order to get a serum pregnancy test obtained.  Patient denies any pregnancy symptoms.      Pt is afebrile on arrival.  Exam as above.  Serum beta hCG today is 55.  Discussed results with patient.  Discussed patient's case with on-call OB/GYN, Dr. Rosen, who recommends her Nexplanon be removed today given risk of delayed implantation and therefore increased risk of ectopic.  Dr. Rosen came down to the urgent care and removed patient's Nexplanon (please see his attached procedure note).  He recommends trending pt's beta hCGs every 2 days until the  discriminatory zone is achieved around 2662-0053 and then pelvic ultrasound to rule out ectopic.  Patient states she follows with OB/GYN at [facility].  Instructed her to make an appointment for this repeat blood test in 2 days.  She expresses understanding of this and agreement with the plan.  Return precautions were reviewed.  Hand-outs were provided.    Patient was instructed to follow-up with OB/GYN as described above for further evaluation and management.  She is to return to the ED for persistent and/or worsening symptoms.  Patient expressed understanding of the diagnosis and plan and was discharged home in good condition.    I have reviewed the nursing notes.    I have reviewed the findings, diagnosis, plan and need for follow up with the patient.    Discharge Medication List as of 6/19/2021  9:36 PM          Final diagnoses:   Positive pregnancy test   Nexplanon removal       6/19/2021   [facility] EMERGENCY DEPT      Disclaimer:  This note consists of symbols derived from keyboarding, dictation and/or voice recognition software.  As a result, there may be errors in the script that have gone undetected.  Please consider this when interpreting information found in this chart.     Julisa Marcelino PA-C  06/20/21 4254

## 2021-06-19 NOTE — ED TRIAGE NOTES
Gets regular periods, has nexplanon  LMP 4/25/21  Having light spotting, no pain  One previous pregnancy normal and healthy without complications  Six positive at-home pregnancy tests, went to urgent care and UPT was negative, told to go another facility for a blood test

## 2021-06-20 ASSESSMENT — ENCOUNTER SYMPTOMS
ABDOMINAL PAIN: 0
MUSCULOSKELETAL NEGATIVE: 1
VOMITING: 0
NAUSEA: 0
CONSTITUTIONAL NEGATIVE: 1

## 2021-06-20 NOTE — DISCHARGE INSTRUCTIONS
Please make appointment to follow-up with your OB/GYN in 2 days for repeat beta-hcg hormone levels.   Please return to the ER sooner if you experience any vaginal bleeding, pelvic or abdominal pain, or any other symptoms of concern.

## 2021-06-20 NOTE — PROGRESS NOTES
Asked by Julisa Allen PA-C to see Tita Prieto a 26 yo  who has a positive HCG and a 14 month old Nexplanon that she was using for contraception.  She intends to keep the prefgnancy.  I recommended that the implant be removed ASAP      Procedure  After discussing risks and benefits of the removal of the implant,   Questions were answered and consent signed.  She  was placed in the supine position with her Left arm extended laterally.The site was then cleansed with Betadine solution. One percent lidocaine was instilled subcutaneously. The implant was moved distally and incision made at the Implant site.  A combination of Jacey forceps and a skin hook were used to retrieve the implant. The capsule around the implant was in incised and the implant removed without difficulty. Hemostasis was assured with combination of pressure and Dermabond. Pressure dressing was placed over the area. She tolerated the procedure well.      HCG Quantitative Serum   Date Value Ref Range Status   2021 55 (H) 0 - 5 IU/L Final       A) pregnancy- unintended  nexplanon in place  Increased ectopic pregnancy risk    P)Nexplanon removal  Recommend:   serial quantitative HCG  (every other day) until the discriminatory zone achieved (9020-8336 international units/l)  then Pelvic ULTRASOUND to rule out ectopic  Jonh Rosen MD

## 2021-06-25 ENCOUNTER — HOSPITAL ENCOUNTER (EMERGENCY)
Facility: CLINIC | Age: 26
Discharge: HOME | End: 2021-06-25
Attending: EMERGENCY MEDICINE | Admitting: EMERGENCY MEDICINE
Payer: OTHER GOVERNMENT

## 2021-06-25 VITALS
HEART RATE: 75 BPM | WEIGHT: 269.18 LBS | DIASTOLIC BLOOD PRESSURE: 76 MMHG | HEIGHT: 68 IN | TEMPERATURE: 98.3 F | OXYGEN SATURATION: 99 % | RESPIRATION RATE: 18 BRPM | BODY MASS INDEX: 40.8 KG/M2 | SYSTOLIC BLOOD PRESSURE: 119 MMHG

## 2021-06-25 DIAGNOSIS — O02.81 CHEMICAL PREGNANCY: Primary | ICD-10-CM

## 2021-06-25 DIAGNOSIS — Z32.01 PREGNANCY TEST POSITIVE: ICD-10-CM

## 2021-06-25 LAB
ALBUMIN SERPL-MCNC: 3.5 G/DL (ref 3.4–5)
ALBUMIN UR-MCNC: NEGATIVE MG/DL
ALP SERPL-CCNC: 97 U/L (ref 40–150)
ALT SERPL W P-5'-P-CCNC: 62 U/L (ref 0–50)
ANION GAP SERPL CALCULATED.3IONS-SCNC: 10 MMOL/L (ref 3–14)
APPEARANCE UR: ABNORMAL
AST SERPL W P-5'-P-CCNC: 32 U/L (ref 0–45)
B-HCG SERPL-ACNC: 49 IU/L (ref 0–5)
BACTERIA #/AREA URNS HPF: ABNORMAL /HPF
BASOPHILS # BLD AUTO: 0 10E9/L (ref 0–0.2)
BASOPHILS NFR BLD AUTO: 0.3 %
BILIRUB SERPL-MCNC: 0.3 MG/DL (ref 0.2–1.3)
BILIRUB UR QL STRIP: NEGATIVE
BUN SERPL-MCNC: 11 MG/DL (ref 7–30)
CALCIUM SERPL-MCNC: 8.6 MG/DL (ref 8.5–10.1)
CHLORIDE SERPL-SCNC: 107 MMOL/L (ref 94–109)
CO2 SERPL-SCNC: 21 MMOL/L (ref 20–32)
COLOR UR AUTO: YELLOW
CREAT SERPL-MCNC: 0.52 MG/DL (ref 0.52–1.04)
DIFFERENTIAL METHOD BLD: NORMAL
EOSINOPHIL # BLD AUTO: 0.1 10E9/L (ref 0–0.7)
EOSINOPHIL NFR BLD AUTO: 0.8 %
ERYTHROCYTE [DISTWIDTH] IN BLOOD BY AUTOMATED COUNT: 13.3 % (ref 10–15)
GFR SERPL CREATININE-BSD FRML MDRD: >90 ML/MIN/{1.73_M2}
GLUCOSE SERPL-MCNC: 89 MG/DL (ref 70–99)
GLUCOSE UR STRIP-MCNC: NEGATIVE MG/DL
HCT VFR BLD AUTO: 38 % (ref 35–47)
HGB BLD-MCNC: 12.6 G/DL (ref 11.7–15.7)
HGB UR QL STRIP: NEGATIVE
IMM GRANULOCYTES # BLD: 0 10E9/L (ref 0–0.4)
IMM GRANULOCYTES NFR BLD: 0.3 %
KETONES UR STRIP-MCNC: 5 MG/DL
LEUKOCYTE ESTERASE UR QL STRIP: ABNORMAL
LIPASE SERPL-CCNC: 60 U/L (ref 73–393)
LYMPHOCYTES # BLD AUTO: 2.1 10E9/L (ref 0.8–5.3)
LYMPHOCYTES NFR BLD AUTO: 23.3 %
MCH RBC QN AUTO: 29 PG (ref 26.5–33)
MCHC RBC AUTO-ENTMCNC: 33.2 G/DL (ref 31.5–36.5)
MCV RBC AUTO: 88 FL (ref 78–100)
MONOCYTES # BLD AUTO: 0.5 10E9/L (ref 0–1.3)
MONOCYTES NFR BLD AUTO: 5.5 %
MUCOUS THREADS #/AREA URNS LPF: PRESENT /LPF
NEUTROPHILS # BLD AUTO: 6.3 10E9/L (ref 1.6–8.3)
NEUTROPHILS NFR BLD AUTO: 69.8 %
NITRATE UR QL: NEGATIVE
NRBC # BLD AUTO: 0 10*3/UL
NRBC BLD AUTO-RTO: 0 /100
PH UR STRIP: 5 PH (ref 5–7)
PLATELET # BLD AUTO: 315 10E9/L (ref 150–450)
POTASSIUM SERPL-SCNC: 3.9 MMOL/L (ref 3.4–5.3)
PROT SERPL-MCNC: 8.3 G/DL (ref 6.8–8.8)
RBC # BLD AUTO: 4.34 10E12/L (ref 3.8–5.2)
RBC #/AREA URNS AUTO: 2 /HPF (ref 0–2)
SODIUM SERPL-SCNC: 138 MMOL/L (ref 133–144)
SOURCE: ABNORMAL
SP GR UR STRIP: 1.03 (ref 1–1.03)
SQUAMOUS #/AREA URNS AUTO: 4 /HPF (ref 0–1)
UROBILINOGEN UR STRIP-MCNC: 0 MG/DL (ref 0–2)
WBC # BLD AUTO: 9 10E9/L (ref 4–11)
WBC #/AREA URNS AUTO: 2 /HPF (ref 0–5)

## 2021-06-25 PROCEDURE — 96360 HYDRATION IV INFUSION INIT: CPT | Performed by: EMERGENCY MEDICINE

## 2021-06-25 PROCEDURE — 83690 ASSAY OF LIPASE: CPT | Performed by: STUDENT IN AN ORGANIZED HEALTH CARE EDUCATION/TRAINING PROGRAM

## 2021-06-25 PROCEDURE — 99284 EMERGENCY DEPT VISIT MOD MDM: CPT | Performed by: EMERGENCY MEDICINE

## 2021-06-25 PROCEDURE — 96372 THER/PROPH/DIAG INJ SC/IM: CPT | Performed by: EMERGENCY MEDICINE

## 2021-06-25 PROCEDURE — 250N000011 HC RX IP 250 OP 636: Mod: JW | Performed by: OBSTETRICS & GYNECOLOGY

## 2021-06-25 PROCEDURE — 84702 CHORIONIC GONADOTROPIN TEST: CPT | Performed by: STUDENT IN AN ORGANIZED HEALTH CARE EDUCATION/TRAINING PROGRAM

## 2021-06-25 PROCEDURE — 85025 COMPLETE CBC W/AUTO DIFF WBC: CPT | Performed by: STUDENT IN AN ORGANIZED HEALTH CARE EDUCATION/TRAINING PROGRAM

## 2021-06-25 PROCEDURE — 99203 OFFICE O/P NEW LOW 30 MIN: CPT | Performed by: OBSTETRICS & GYNECOLOGY

## 2021-06-25 PROCEDURE — 96361 HYDRATE IV INFUSION ADD-ON: CPT | Performed by: EMERGENCY MEDICINE

## 2021-06-25 PROCEDURE — 80053 COMPREHEN METABOLIC PANEL: CPT | Performed by: STUDENT IN AN ORGANIZED HEALTH CARE EDUCATION/TRAINING PROGRAM

## 2021-06-25 PROCEDURE — 81001 URINALYSIS AUTO W/SCOPE: CPT | Performed by: STUDENT IN AN ORGANIZED HEALTH CARE EDUCATION/TRAINING PROGRAM

## 2021-06-25 PROCEDURE — 258N000003 HC RX IP 258 OP 636: Performed by: EMERGENCY MEDICINE

## 2021-06-25 PROCEDURE — 99284 EMERGENCY DEPT VISIT MOD MDM: CPT | Mod: 25 | Performed by: EMERGENCY MEDICINE

## 2021-06-25 RX ORDER — METHOTREXATE 25 MG/ML
50 INJECTION INTRA-ARTERIAL; INTRAMUSCULAR; INTRATHECAL; INTRAVENOUS ONCE
Status: COMPLETED | OUTPATIENT
Start: 2021-06-25 | End: 2021-06-25

## 2021-06-25 RX ADMIN — SODIUM CHLORIDE 1000 ML: 9 INJECTION, SOLUTION INTRAVENOUS at 13:49

## 2021-06-25 RX ADMIN — METHOTREXATE 118 MG: 25 SOLUTION INTRA-ARTERIAL; INTRAMUSCULAR; INTRATHECAL; INTRAVENOUS at 14:50

## 2021-06-25 ASSESSMENT — MIFFLIN-ST. JEOR: SCORE: 2004.5

## 2021-06-25 NOTE — CONSULTS
[facility]    OB/GYN Consult    Patient's Name: Tita Prieto   YOB: 1995  Age:  25 yrs   Date of service: 2021  Attending Physician: Taylor Velasquez    Date of Admission: 2021  Source of Information: patient, medical record    History of Present Illness:   This is a 25 year old  at unknown gestation, who presents for several mild complaints including nausea, dizziness, and abdominal pain. Patient has been followed for pregnancy of unknown location. Found to be pregnant with Nexplanon in place. Nexplanon was removed and beta hcg has been trended.      55   53   49    Over the past day complains of mild symptoms of nausea and some cramping. She was told to come to be evaluated if she had any symptoms.     Obstetrical History:       Gynecologic History:   LMP: Patient's last menstrual period was 2021.    Past Medical History:     Past Medical History:   Diagnosis Date     Seizures (H)        Past Surgical History:     Past Surgical History:   Procedure Laterality Date     SURGICAL HISTORY OF -   1994    PE tubes       Social History:     Social History     Tobacco Use     Smoking status: Never Smoker     Smokeless tobacco: Never Used   Substance Use Topics     Alcohol use: No     Drug use: No       Family History:     Family History   Problem Relation Age of Onset     No Known Problems Mother      No Known Problems Father        Medications:     Prior to Admission medications    Medication Sig Last Dose Taking? Auth Provider   carBAMazepine (CARBATROL) 300 MG 12 hr capsule TAKE 1 CAPSULE BY MOUTH ONCE DAILY IN THE MORNING AND 2 CAPSULES ONCE DAILY IN THE EVENING.   Maris Otero MD   folic acid 1 MG PO tablet Take 2 tablets (2 mg) by mouth daily  Patient not taking: Reported on 2021   Maris Otero MD   Prenatal Vit-Fe Fumarate-FA (PRENATAL MULTIVITAMIN W/IRON) 27-0.8 MG tablet Take 1 tablet by mouth every evening   Reported,  "Patient   rOPINIRole (REQUIP) 0.5 MG tablet Take 1 tablet (0.5 mg) by mouth At Bedtime Call clinic to schedule follow up appointment.   Maris Otero MD   sertraline (ZOLOFT) 50 MG tablet 50 mg   Reported, Patient       Allergies:      Allergies   Allergen Reactions     Influenza Vaccines Other (See Comments)     Has history of Guillain [place] syndrome     Shellfish-Derived Products Other (See Comments)     Mouth tingles       Review of Systems:     ROS: 10-point ROS negative except as in HPI     Physical Exam:   /76   Pulse 75   Temp 98.3  F (36.8  C) (Temporal)   Resp 18   Ht 1.727 m (5' 8\")   Wt 122.1 kg (269 lb 2.9 oz)   LMP 04/25/2021   SpO2 99%   BMI 40.93 kg/m    Vitals:    06/25/21 1109   BP: 119/76   Pulse: 75   Resp: 18   Temp: 98.3  F (36.8  C)   TempSrc: Temporal   SpO2: 99%   Weight: 122.1 kg (269 lb 2.9 oz)   Height: 1.727 m (5' 8\")       General appearance: well-hydrated, A&O x 3, no apparent distress, no alteration in mood, no anxiety, no depression no agitation  Lungs: Equal expansion bilaterally, no accessory muscle use  Heart: No heaves or thrills. No peripheral varicosities  Constitutional: See vitals  Abdomen: Soft, non-tender, non-distended. No rebound, rigidity, or guarding.  Extremities: no edema        Labs     HCG Quantitative Serum   Date Value Ref Range Status   06/25/2021 49 (H) 0 - 5 IU/L Final     Lab Results   Component Value Date    WBC 9.0 06/25/2021     Lab Results   Component Value Date    RBC 4.34 06/25/2021     Lab Results   Component Value Date    HGB 12.6 06/25/2021     Lab Results   Component Value Date    HCT 38.0 06/25/2021     No components found for: MCT  Lab Results   Component Value Date    MCV 88 06/25/2021     Lab Results   Component Value Date    MCH 29.0 06/25/2021     Lab Results   Component Value Date    MCHC 33.2 06/25/2021     Lab Results   Component Value Date    RDW 13.3 06/25/2021     Lab Results   Component Value Date     06/25/2021 "     Last Comprehensive Metabolic Panel:  Sodium   Date Value Ref Range Status   06/25/2021 138 133 - 144 mmol/L Final     Potassium   Date Value Ref Range Status   06/25/2021 3.9 3.4 - 5.3 mmol/L Final     Chloride   Date Value Ref Range Status   06/25/2021 107 94 - 109 mmol/L Final     Carbon Dioxide   Date Value Ref Range Status   06/25/2021 21 20 - 32 mmol/L Final     Anion Gap   Date Value Ref Range Status   06/25/2021 10 3 - 14 mmol/L Final     Glucose   Date Value Ref Range Status   06/25/2021 89 70 - 99 mg/dL Final     Urea Nitrogen   Date Value Ref Range Status   06/25/2021 11 7 - 30 mg/dL Final     Creatinine   Date Value Ref Range Status   06/25/2021 0.52 0.52 - 1.04 mg/dL Final     GFR Estimate   Date Value Ref Range Status   06/25/2021 >90 >60 mL/min/[1.73_m2] Final     Comment:     Non  GFR Calc  Starting 12/18/2018, serum creatinine based estimated GFR (eGFR) will be   calculated using the Chronic Kidney Disease Epidemiology Collaboration   (CKD-EPI) equation.       Calcium   Date Value Ref Range Status   06/25/2021 8.6 8.5 - 10.1 mg/dL Final     Bilirubin Total   Date Value Ref Range Status   06/25/2021 0.3 0.2 - 1.3 mg/dL Final     Alkaline Phosphatase   Date Value Ref Range Status   06/25/2021 97 40 - 150 U/L Final     ALT   Date Value Ref Range Status   06/25/2021 62 (H) 0 - 50 U/L Final     AST   Date Value Ref Range Status   06/25/2021 32 0 - 45 U/L Final                 Assessment and Plan:     Discussed findings of plateaued beta hcg, confirming non viable pregnancy with inappropriate rise. Chemical pregnancy of unknown location. Imaging unlikely to be helpful at such low beta hcg and with benign exam. Surgery also unlikely to be helpful. Would offer methotrexate vs. expectant management at this point. Discussed risks and benefits. Patient would like to proceed with methotrexate therapy. Patient with slightly increased ALT but not twice the upper limit of normal, also no history  of liver disease. Plan for weight based dosing today. Instructed on follow up with labs on day #4 and #7 next week. Ordered placed. Follow up in clinic. Patient interested in alternative contraceptive plan, undecided on type at this time. Instructed on active pregnancy prevention until beta hcg returns to negative.    Taylor Velasquez,

## 2021-06-25 NOTE — DISCHARGE INSTRUCTIONS
Follow-up on Monday and Thursday for lab testing per OB, call [phone] to schedule with lab    Follow-up will be as discussed    Return here for pain, vomiting, passing out, profuse bleeding or any other concern

## 2021-06-28 NOTE — ED PROVIDER NOTES
History     Chief Complaint   Patient presents with     Abdominal Pain     abdominal pain and cramping. pt seen a few days ago for positive pregnancy test with implanon removed during that visit.  pt was told to come to ER by VERONICA XIE  Tita Prieto is a 25 year old female who presents 6 days after positive pregnancy test with Nexplanon in place.  Seen here by Julisa Lopez, note reviewed from that visit.  Presents secondary to some abdominal cramping mild, nausea without vomiting.  No known fever.  Denies vaginal bleeding or spotting.  ECG trended remaining around 50 for the past week.    Allergies:  Allergies   Allergen Reactions     Influenza Vaccines Other (See Comments)     Has history of Guillain [place] syndrome     Shellfish-Derived Products Other (See Comments)     Mouth tingles       Problem List:    Patient Active Problem List    Diagnosis Date Noted     Seizure (H) 12/10/2018     Priority: Medium     Morbid obesity with BMI of 40.0-44.9, adult (H) 2017     Priority: Medium     Irritable bowel syndrome with diarrhea 2017     Priority: Medium     Overview:   2017: Fiber       Guillain Barré syndrome (H) 2016     Priority: Medium     Overview:   ?? 2015 [facility] in [place] hospital visit. Avoiding flu vaccines.       Hand weakness 2015     Priority: Medium     Contraception 2015     Priority: Medium     Overview:   Nexplanon placed 2015       Mixed hyperlipidemia 2014     Priority: Medium     Overview:     Last Lipids:  Last Lipids:  Chol: 2016 207   101  HDL: 2016 49   LDL: 2016 138  LDL DIRECT: . No results found in past 5 years       Restless legs syndrome (RLS) 2013     Priority: Medium     Overview:   : Started on Requip  She is on the 0.25mg at bedtime/night and is able to get 6 hours of sleep.       Seizure disorder (H) 2013     Priority: Medium     Overview:   Carbamezapine 300mg am and 600mg pm.  "  Sees neurologist  Last seizure episode- 10/2015          Past Medical History:    Past Medical History:   Diagnosis Date     Seizures (H)        Past Surgical History:    Past Surgical History:   Procedure Laterality Date     SURGICAL HISTORY OF -   06/03/1994    PE tubes       Family History:    Family History   Problem Relation Age of Onset     No Known Problems Mother      No Known Problems Father        Social History:  Marital Status:   [2]  Social History     Tobacco Use     Smoking status: Never Smoker     Smokeless tobacco: Never Used   Substance Use Topics     Alcohol use: No     Drug use: No        Medications:    carBAMazepine (CARBATROL) 300 MG 12 hr capsule  folic acid 1 MG PO tablet  Prenatal Vit-Fe Fumarate-FA (PRENATAL MULTIVITAMIN W/IRON) 27-0.8 MG tablet  rOPINIRole (REQUIP) 0.5 MG tablet  sertraline (ZOLOFT) 50 MG tablet          Review of Systems  All other systems reviewed and are negative.    Physical Exam   BP: 119/76  Pulse: 75  Temp: 98.3  F (36.8  C)  Resp: 18  Height: 172.7 cm (5' 8\")  Weight: 122.1 kg (269 lb 2.9 oz)  SpO2: 99 %      Physical Exam  Nontoxic appearing no respiratory distress alert and oriented ×3  Head atraumatic normocephalic  Neck supple full active painless range of motion  Lungs clear to auscultation  Heart regular no murmur  Abdomen soft nontender bowel sounds positive   Strength and sensation grossly intact throughout the extremities, gait and station normal  Speech is fluent, good eye contact, thought processes are rational  Lower extremities without swelling, redness or tenderness  Pedal pulses symmetrical and strong    ED Course        Procedures  Results for orders placed or performed during the hospital encounter of 06/25/21   CBC with platelets differential     Status: None   Result Value Ref Range    WBC 9.0 4.0 - 11.0 10e9/L    RBC Count 4.34 3.8 - 5.2 10e12/L    Hemoglobin 12.6 11.7 - 15.7 g/dL    Hematocrit 38.0 35.0 - 47.0 %    MCV 88 78 - 100 fl "    MCH 29.0 26.5 - 33.0 pg    MCHC 33.2 31.5 - 36.5 g/dL    RDW 13.3 10.0 - 15.0 %    Platelet Count 315 150 - 450 10e9/L    Diff Method Automated Method     % Neutrophils 69.8 %    % Lymphocytes 23.3 %    % Monocytes 5.5 %    % Eosinophils 0.8 %    % Basophils 0.3 %    % Immature Granulocytes 0.3 %    Nucleated RBCs 0 0 /100    Absolute Neutrophil 6.3 1.6 - 8.3 10e9/L    Absolute Lymphocytes 2.1 0.8 - 5.3 10e9/L    Absolute Monocytes 0.5 0.0 - 1.3 10e9/L    Absolute Eosinophils 0.1 0.0 - 0.7 10e9/L    Absolute Basophils 0.0 0.0 - 0.2 10e9/L    Abs Immature Granulocytes 0.0 0 - 0.4 10e9/L    Absolute Nucleated RBC 0.0    Comprehensive metabolic panel     Status: Abnormal   Result Value Ref Range    Sodium 138 133 - 144 mmol/L    Potassium 3.9 3.4 - 5.3 mmol/L    Chloride 107 94 - 109 mmol/L    Carbon Dioxide 21 20 - 32 mmol/L    Anion Gap 10 3 - 14 mmol/L    Glucose 89 70 - 99 mg/dL    Urea Nitrogen 11 7 - 30 mg/dL    Creatinine 0.52 0.52 - 1.04 mg/dL    GFR Estimate >90 >60 mL/min/[1.73_m2]    GFR Estimate If Black >90 >60 mL/min/[1.73_m2]    Calcium 8.6 8.5 - 10.1 mg/dL    Bilirubin Total 0.3 0.2 - 1.3 mg/dL    Albumin 3.5 3.4 - 5.0 g/dL    Protein Total 8.3 6.8 - 8.8 g/dL    Alkaline Phosphatase 97 40 - 150 U/L    ALT 62 (H) 0 - 50 U/L    AST 32 0 - 45 U/L   Lipase     Status: Abnormal   Result Value Ref Range    Lipase 60 (L) 73 - 393 U/L   HCG quantitative pregnancy     Status: Abnormal   Result Value Ref Range    HCG Quantitative Serum 49 (H) 0 - 5 IU/L   UA reflex to Microscopic and Culture     Status: Abnormal    Specimen: Midstream Urine   Result Value Ref Range    Color Urine Yellow     Appearance Urine Slightly Cloudy     Glucose Urine Negative NEG^Negative mg/dL    Bilirubin Urine Negative NEG^Negative    Ketones Urine 5 (A) NEG^Negative mg/dL    Specific Gravity Urine 1.030 1.003 - 1.035    Blood Urine Negative NEG^Negative    pH Urine 5.0 5.0 - 7.0 pH    Protein Albumin Urine Negative NEG^Negative  mg/dL    Urobilinogen mg/dL 0.0 0.0 - 2.0 mg/dL    Nitrite Urine Negative NEG^Negative    Leukocyte Esterase Urine Trace (A) NEG^Negative    Source Midstream Urine     RBC Urine 2 0 - 2 /HPF    WBC Urine 2 0 - 5 /HPF    Bacteria Urine Few (A) NEG^Negative /HPF    Squamous Epithelial /HPF Urine 4 (H) 0 - 1 /HPF    Mucous Urine Present (A) NEG^Negative /LPF                  Critical Care time:  none                  No results found for this or any previous visit (from the past 24 hour(s)).    Medications   0.9% sodium chloride BOLUS (0 mLs Intravenous Stopped 6/25/21 1534)   methotrexate sodium (pres-free) injection 118 mg (118 mg Intramuscular Given 6/25/21 1450)       Assessments & Plan (with Medical Decision Making)  hCG plateauing at 50 for the past week, positive pregnancy test with Nexplanon.  Nexplanon removed last week.  Returns here today with mild cramping and nausea.  Reviewed with Dr. Velasquez OB/GYN who saw patient in ED.  See consult note.  Recommendation was methotrexate versus expectant management.  Methotrexate therapy administered in ED without acute complication.  Follow-up OB/GYN.  Return here for pain, fever, vomiting or any other concern.     I have reviewed the nursing notes.    I have reviewed the findings, diagnosis, plan and need for follow up with the patient.       Discharge Medication List as of 6/25/2021  4:15 PM          Final diagnoses:   Pregnancy test positive       6/25/2021   [facility] EMERGENCY DEPT     Raj Holley MD  06/28/21 7508

## 2021-06-29 DIAGNOSIS — O02.81 CHEMICAL PREGNANCY: ICD-10-CM

## 2021-06-29 LAB — B-HCG SERPL-ACNC: 37 IU/L (ref 0–5)

## 2021-06-29 PROCEDURE — 36415 COLL VENOUS BLD VENIPUNCTURE: CPT | Performed by: OBSTETRICS & GYNECOLOGY

## 2021-06-29 PROCEDURE — 84702 CHORIONIC GONADOTROPIN TEST: CPT | Performed by: OBSTETRICS & GYNECOLOGY

## 2021-07-01 DIAGNOSIS — O02.81 CHEMICAL PREGNANCY: ICD-10-CM

## 2021-07-01 DIAGNOSIS — O02.81 CHEMICAL PREGNANCY: Primary | ICD-10-CM

## 2021-07-01 LAB
ALBUMIN SERPL-MCNC: 3.4 G/DL (ref 3.4–5)
ALP SERPL-CCNC: 97 U/L (ref 40–150)
ALT SERPL W P-5'-P-CCNC: 45 U/L (ref 0–50)
ANION GAP SERPL CALCULATED.3IONS-SCNC: 4 MMOL/L (ref 3–14)
AST SERPL W P-5'-P-CCNC: 19 U/L (ref 0–45)
B-HCG SERPL-ACNC: 30 IU/L (ref 0–5)
BILIRUB SERPL-MCNC: 0.4 MG/DL (ref 0.2–1.3)
BUN SERPL-MCNC: 11 MG/DL (ref 7–30)
CALCIUM SERPL-MCNC: 8.9 MG/DL (ref 8.5–10.1)
CHLORIDE SERPL-SCNC: 105 MMOL/L (ref 94–109)
CO2 SERPL-SCNC: 29 MMOL/L (ref 20–32)
CREAT SERPL-MCNC: 0.64 MG/DL (ref 0.52–1.04)
ERYTHROCYTE [DISTWIDTH] IN BLOOD BY AUTOMATED COUNT: 13.8 % (ref 10–15)
GFR SERPL CREATININE-BSD FRML MDRD: >90 ML/MIN/{1.73_M2}
GLUCOSE SERPL-MCNC: 83 MG/DL (ref 70–99)
HCT VFR BLD AUTO: 37.5 % (ref 35–47)
HGB BLD-MCNC: 12.5 G/DL (ref 11.7–15.7)
MCH RBC QN AUTO: 29.2 PG (ref 26.5–33)
MCHC RBC AUTO-ENTMCNC: 33.3 G/DL (ref 31.5–36.5)
MCV RBC AUTO: 88 FL (ref 78–100)
PLATELET # BLD AUTO: 300 10E9/L (ref 150–450)
POTASSIUM SERPL-SCNC: 4 MMOL/L (ref 3.4–5.3)
PROT SERPL-MCNC: 8.2 G/DL (ref 6.8–8.8)
RBC # BLD AUTO: 4.28 10E12/L (ref 3.8–5.2)
SODIUM SERPL-SCNC: 138 MMOL/L (ref 133–144)
WBC # BLD AUTO: 6.7 10E9/L (ref 4–11)

## 2021-07-01 PROCEDURE — 36415 COLL VENOUS BLD VENIPUNCTURE: CPT | Performed by: OBSTETRICS & GYNECOLOGY

## 2021-07-01 PROCEDURE — 85027 COMPLETE CBC AUTOMATED: CPT | Performed by: OBSTETRICS & GYNECOLOGY

## 2021-07-01 PROCEDURE — 80053 COMPREHEN METABOLIC PANEL: CPT | Performed by: OBSTETRICS & GYNECOLOGY

## 2021-07-01 PROCEDURE — 84702 CHORIONIC GONADOTROPIN TEST: CPT | Performed by: OBSTETRICS & GYNECOLOGY

## 2021-07-02 NOTE — RESULT ENCOUNTER NOTE
Pt notified of below.  Pt reports understanding.  Pt does not have further questions or concerns.  Lab appointment scheduled for 7/8/2021    Angelia Flores   Ob/Gyn Clinic  RN

## 2021-07-08 DIAGNOSIS — O02.81 CHEMICAL PREGNANCY: ICD-10-CM

## 2021-07-08 LAB — B-HCG SERPL-ACNC: <1 IU/L (ref 0–5)

## 2021-07-08 PROCEDURE — 36415 COLL VENOUS BLD VENIPUNCTURE: CPT | Performed by: OBSTETRICS & GYNECOLOGY

## 2021-07-08 PROCEDURE — 84702 CHORIONIC GONADOTROPIN TEST: CPT | Performed by: OBSTETRICS & GYNECOLOGY

## 2021-07-24 ENCOUNTER — HEALTH MAINTENANCE LETTER (OUTPATIENT)
Age: 26
End: 2021-07-24

## 2021-09-18 ENCOUNTER — HEALTH MAINTENANCE LETTER (OUTPATIENT)
Age: 26
End: 2021-09-18

## 2021-10-07 ENCOUNTER — VIRTUAL VISIT (OUTPATIENT)
Dept: NEUROLOGY | Facility: CLINIC | Age: 26
End: 2021-10-07
Payer: OTHER GOVERNMENT

## 2021-10-07 DIAGNOSIS — G25.81 RESTLESS LEG SYNDROME: ICD-10-CM

## 2021-10-07 DIAGNOSIS — G40.909 SEIZURE DISORDER (H): ICD-10-CM

## 2021-10-07 RX ORDER — FOLIC ACID 1 MG/1
2 TABLET ORAL DAILY
Qty: 60 TABLET | Refills: 11 | Status: SHIPPED | OUTPATIENT
Start: 2021-10-07 | End: 2022-04-07

## 2021-10-07 RX ORDER — CARBAMAZEPINE 300 MG/1
CAPSULE, EXTENDED RELEASE ORAL
Qty: 90 CAPSULE | Refills: 11 | Status: SHIPPED | OUTPATIENT
Start: 2021-10-07 | End: 2022-04-07

## 2021-10-07 RX ORDER — ROPINIROLE 0.5 MG/1
0.5 TABLET, FILM COATED ORAL AT BEDTIME
Qty: 30 TABLET | Refills: 11 | Status: SHIPPED | OUTPATIENT
Start: 2021-10-07 | End: 2022-04-07

## 2021-10-07 NOTE — PROGRESS NOTES
"CC: Follow up for seizures.     HPI: Video call  for follow up.  She is a 26-year-old, right-handed female with a history of seizures since 2000.  She [facility]. [facility] from [facility] in [facility]. Since the last visit about 6 months ago,  she had no seizures. Her last seizure was in Dec, 2018 likely due to sleep deprivation and with URI for a few days around that time. She had her daughter about 2 year ago, baby was healthy, no issues. Her most recently Tegretol level in June 2020 was 10.1.  Most recent Na was 138.  She is currently taking Carbamazepine 300 mg - 600 mg.  No side effect reported.  She is compliant with her medications.      She tried Keppra 750 mg bid in the past.  She could not tolerate the keppra.   She was also diagnosed with \"restless leg syndrome\".  She is currently on Requip 05 mg qhs, and it is working very well to control her symptoms.  She also mentioned that exercise helped a lot for her RLS.  If she does not exercise, she will have a lot of problems with RLS.     Her seizures started in 2000. Her typical seizures are described as she has an aura of right arm and leg tingling achy sensation, then followed by right arm and leg shaking arrhythmically which lasts for about 30 seconds to 1 minute. She was started on carbamazepine many years ago, and her seizures were well controlled until 05/2009 when she was trying to wean herself off the carbamazepine, then she started seizure recurrence. She had a series of a generalized tonic-clonic seizures at that time, so she was put back on carbamazepine. Her seizures usually occur in the early morning. In 12/2011, when she was seeing Dr. Villa, he was trying to wean the patient off the carbamazepine again, and in February, after she was off the medication for a week, then she had another simple partial seizure with the right arm and the leg jerking for about 1 minute,   RISK FACTORS FOR SEIZURES: She had no history of head trauma with loss of " consciousness. No history of CNS infections. No history of febrile convulsions.    TRIGGERS FOR SEIZURES Unknown.   PAST MEDICAL HISTORY: None.   PAST SURGICAL HISTORY: None.   FAMILY HISTORY: No family history of seizures. Grandmother had a history of diabetes. Aunt had a history of brain hemorrhage. Mother and sister have RLS.       Current Outpatient Medications   Medication Sig Dispense Refill     carBAMazepine (CARBATROL) 300 MG 12 hr capsule TAKE 1 CAPSULE BY MOUTH ONCE DAILY IN THE MORNING AND 2 CAPSULES ONCE DAILY IN THE EVENING. 90 capsule 11     Prenatal Vit-Fe Fumarate-FA (PRENATAL MULTIVITAMIN W/IRON) 27-0.8 MG tablet Take 1 tablet by mouth every evening       rOPINIRole (REQUIP) 0.5 MG tablet Take 1 tablet (0.5 mg) by mouth At Bedtime Call clinic to schedule follow up appointment. 30 tablet 11     sertraline (ZOLOFT) 50 MG tablet 50 mg daily        folic acid 1 MG PO tablet Take 2 tablets (2 mg) by mouth daily (Patient not taking: Reported on 4/1/2021) 60 tablet 11         ALLERGIES: No known drug allergies.   SOCIAL HISTORY: She is single, living with her mother. No smoking, no alcohol, no drug abuse.  She is in college, major in social work.  She is staying home taking care of her child.    REVIEW OF SYSTEMS: Negative.  PREVIOUS DIAGNOSTIC TESTING: She had an MRI and EEG studies over 10 years ago. Results are not available at this time.      PHYSICAL EXAMINATION:    AAO x 3, speech fluent and appropriate. No facial weakness, hearing grossly normal.     Labs:     Component      Latest Ref Rng & Units 1/6/2018   Carbamazepine Total Level      4.0 - 12.0 ug/mL 9.7   10, 11 Epoxide Level      0.4 - 4.0 ug/mL 1.4   Free Carbamazepine Level Ug/Ml      0.6 - 4.2 ug/mL 1.8   Free Epoxide Level      0.2 - 2.0 ug/mL 0.6      IMPRESSION:   1. Epilepsy.      Since the last visit about 6 months ago,  she had no seizures. Her last seizure was in Dec, 2018 likely due to sleep deprivation and with URI for a few days  around that time. She is currently taking Carbamazepine 300 mg - 600 mg.  No side effect reported.  She is compliant with her medications.     2. Restless leg syndrome. Improved. Continue on Requip.  She is currently on Requip 05 mg qhs, and it is working very well to control her symptoms.  She also mentioned that exercise helped a lot for her RLS.  If she does not exercise, she will have a lot of problems with RLS.     PLAN:   1. Continue carbamazepine 300 mg in a.m. 600 mg p.m.    2. Continue Requip 0.5 mg qhs.  3. Folic acid 2 mg qd.  4. RTC in 6 months.      22 min total time was spent on the day of this visit.      10 min was spent on face to face time  6 min was spent on preparation of visit to review charts and labs, ordering medications and tests  6 min was spent on documentation of clinical information

## 2021-10-07 NOTE — PROGRESS NOTES
Tita is a 26 year old who is being evaluated via a billable video visit.      How would you like to obtain your AVS? MyChart  If the video visit is dropped, the invitation should be resent by: Text to cell phone: [phone]  Will anyone else be joining your video visit? No      Video Start Time: 9:26 AM  Video-Visit Details    Type of service:  Video Visit    Video End Time:9:36 AM    Originating Location (pt. Location): Home    Distant Location (provider location):  [facility]PI[facility]     Platform used for Video Visit: Ling

## 2021-10-07 NOTE — LETTER
"10/7/2021     RE: Tita Prieto  : 1995   MRN: [id number]      Dear Colleague,    Thank you for referring your patient, Tita Prieto, to the [facility] EPILE[facility]ARE at [facility]. Please see a copy of my visit note below.    Tita is a 26 year old who is being evaluated via a billable video visit.      How would you like to obtain your AVS? MyChart  If the video visit is dropped, the invitation should be resent by: Text to cell phone: [phone]  Will anyone else be joining your video visit? No      Video Start Time: 9:26 AM  Video-Visit Details    Type of service:  Video Visit    Video End Time:9:36 AM    Originating Location (pt. Location): Home    Distant Location (provider location):  [facility]PILE[facility]     Platform used for Video Visit: [employer]      CC: Follow up for seizures.     HPI: Video call  for follow up.  She is a 26-year-old, right-handed female with a history of seizures since .  She [facility] Dr. Villa from [facility] in [facility]. Since the last visit about 6 months ago,  she had no seizures. Her last seizure was in Dec, 2018 likely due to sleep deprivation and with URI for a few days around that time. She had her daughter about 2 year ago, baby was healthy, no issues. Her most recently Tegretol level in 2020 was 10.1.  Most recent Na was 138.  She is currently taking Carbamazepine 300 mg - 600 mg.  No side effect reported.  She is compliant with her medications.      She tried Keppra 750 mg bid in the past.  She could not tolerate the keppra.   She was also diagnosed with \"restless leg syndrome\".  She is currently on Requip 05 mg qhs, and it is working very well to control her symptoms.  She also mentioned that exercise helped a lot for her RLS.  If she does not exercise, she will have a lot of problems with RLS.     Her seizures started in . Her typical seizures are described as she has an aura of right arm and leg tingling " achy sensation, then followed by right arm and leg shaking arrhythmically which lasts for about 30 seconds to 1 minute. She was started on carbamazepine many years ago, and her seizures were well controlled until 05/2009 when she was trying to wean herself off the carbamazepine, then she started seizure recurrence. She had a series of a generalized tonic-clonic seizures at that time, so she was put back on carbamazepine. Her seizures usually occur in the early morning. In 12/2011, when she was seeing Dr. Villa, he was trying to wean the patient off the carbamazepine again, and in February, after she was off the medication for a week, then she had another simple partial seizure with the right arm and the leg jerking for about 1 minute,   RISK FACTORS FOR SEIZURES: She had no history of head trauma with loss of consciousness. No history of CNS infections. No history of febrile convulsions.    TRIGGERS FOR SEIZURES Unknown.   PAST MEDICAL HISTORY: None.   PAST SURGICAL HISTORY: None.   FAMILY HISTORY: No family history of seizures. Grandmother had a history of diabetes. Aunt had a history of brain hemorrhage. Mother and sister have RLS.       Current Outpatient Medications   Medication Sig Dispense Refill     carBAMazepine (CARBATROL) 300 MG 12 hr capsule TAKE 1 CAPSULE BY MOUTH ONCE DAILY IN THE MORNING AND 2 CAPSULES ONCE DAILY IN THE EVENING. 90 capsule 11     Prenatal Vit-Fe Fumarate-FA (PRENATAL MULTIVITAMIN W/IRON) 27-0.8 MG tablet Take 1 tablet by mouth every evening       rOPINIRole (REQUIP) 0.5 MG tablet Take 1 tablet (0.5 mg) by mouth At Bedtime Call clinic to schedule follow up appointment. 30 tablet 11     sertraline (ZOLOFT) 50 MG tablet 50 mg daily        folic acid 1 MG PO tablet Take 2 tablets (2 mg) by mouth daily (Patient not taking: Reported on 4/1/2021) 60 tablet 11         ALLERGIES: No known drug allergies.   SOCIAL HISTORY: She is single, living with her mother. No smoking, no alcohol, no drug  abuse.  She is in college, major in social work.  She is staying home taking care of her child.    REVIEW OF SYSTEMS: Negative.  PREVIOUS DIAGNOSTIC TESTING: She had an MRI and EEG studies over 10 years ago. Results are not available at this time.      PHYSICAL EXAMINATION:    AAO x 3, speech fluent and appropriate. No facial weakness, hearing grossly normal.     Labs:     Component      Latest Ref Rng & Units 1/6/2018   Carbamazepine Total Level      4.0 - 12.0 ug/mL 9.7   10, 11 Epoxide Level      0.4 - 4.0 ug/mL 1.4   Free Carbamazepine Level Ug/Ml      0.6 - 4.2 ug/mL 1.8   Free Epoxide Level      0.2 - 2.0 ug/mL 0.6      IMPRESSION:   1. Epilepsy.      Since the last visit about 6 months ago,  she had no seizures. Her last seizure was in Dec, 2018 likely due to sleep deprivation and with URI for a few days around that time. She is currently taking Carbamazepine 300 mg - 600 mg.  No side effect reported.  She is compliant with her medications.     2. Restless leg syndrome. Improved. Continue on Requip.  She is currently on Requip 05 mg qhs, and it is working very well to control her symptoms.  She also mentioned that exercise helped a lot for her RLS.  If she does not exercise, she will have a lot of problems with RLS.     PLAN:   1. Continue carbamazepine 300 mg in a.m. 600 mg p.m.    2. Continue Requip 0.5 mg qhs.  3. Folic acid 2 mg qd.  4. RTC in 6 months.    22 min total time was spent on the day of this visit.      10 min was spent on face to face time  6 min was spent on preparation of visit to review charts and labs, ordering medications and tests  6 min was spent on documentation of clinical information    Again, thank you for allowing me to participate in the care of your patient.      Sincerely,    Maris Otero MD

## 2022-04-07 ENCOUNTER — VIRTUAL VISIT (OUTPATIENT)
Dept: NEUROLOGY | Facility: CLINIC | Age: 27
End: 2022-04-07
Payer: OTHER GOVERNMENT

## 2022-04-07 DIAGNOSIS — G40.909 SEIZURE DISORDER (H): ICD-10-CM

## 2022-04-07 DIAGNOSIS — G25.81 RESTLESS LEG SYNDROME: ICD-10-CM

## 2022-04-07 RX ORDER — ROPINIROLE 0.5 MG/1
0.5 TABLET, FILM COATED ORAL AT BEDTIME
Qty: 30 TABLET | Refills: 11 | Status: SHIPPED | OUTPATIENT
Start: 2022-04-07 | End: 2023-03-16

## 2022-04-07 RX ORDER — FOLIC ACID 1 MG/1
2 TABLET ORAL DAILY
Qty: 60 TABLET | Refills: 11 | Status: SHIPPED | OUTPATIENT
Start: 2022-04-07 | End: 2023-03-16

## 2022-04-07 RX ORDER — CARBAMAZEPINE 300 MG/1
CAPSULE, EXTENDED RELEASE ORAL
Qty: 90 CAPSULE | Refills: 11 | Status: SHIPPED | OUTPATIENT
Start: 2022-04-07 | End: 2022-11-03

## 2022-04-07 NOTE — PROGRESS NOTES
Tita is a 26 year old who is being evaluated via a billable phone visit.      Time on the phone: 12 min

## 2022-04-07 NOTE — PROGRESS NOTES
"CC: Follow up for seizures.     HPI: Telephone call for follow up.  She is a 26-year-old, right-handed female with a history of seizures since 2000.  Since the last visit about 6 months ago,  she had no seizures. Her last seizure was in Dec, 2018, likely due to sleep deprivation and with URI for a few days around that time. She daughter is 2 years old, baby was healthy, no issues. Her most recently Tegretol level in June 2020 was 10.1.  Most recent Na was 138.  She is currently taking Carbamazepine 300 mg - 600 mg.  No side effect reported.  She is compliant with her medications.      She tried Keppra 750 mg bid in the past.  She could not tolerate the keppra.   She was also diagnosed with \"restless leg syndrome\".  She is currently on Requip 05 mg qhs, and it is working very well to control her symptoms.  She also mentioned that exercise helped a lot for her RLS.  If she does not exercise, she will have a lot of problems with RLS.     Her seizures started in 2000. Her typical seizures are described as she has an aura of right arm and leg tingling achy sensation, then followed by right arm and leg shaking arrhythmically which lasts for about 30 seconds to 1 minute. She was started on carbamazepine many years ago, and her seizures were well controlled until 05/2009 when she was trying to wean herself off the carbamazepine, then she started seizure recurrence. She had a series of a generalized tonic-clonic seizures at that time, so she was put back on carbamazepine. Her seizures usually occur in the early morning. In 12/2011, when she was seeing Dr. Villa, he was trying to wean the patient off the carbamazepine again, and in February, after she was off the medication for a week, then she had another simple partial seizure with the right arm and the leg jerking for about 1 minute,   RISK FACTORS FOR SEIZURES: She had no history of head trauma with loss of consciousness. No history of CNS infections. No history of " febrile convulsions.    TRIGGERS FOR SEIZURES Unknown.   PAST MEDICAL HISTORY: None.   PAST SURGICAL HISTORY: None.   FAMILY HISTORY: No family history of seizures. Grandmother had a history of diabetes. Aunt had a history of brain hemorrhage. Mother and sister have RLS.       Current Outpatient Medications   Medication Sig Dispense Refill     carBAMazepine (CARBATROL) 300 MG 12 hr capsule TAKE 1 CAPSULE BY MOUTH ONCE DAILY IN THE MORNING AND 2 CAPSULES ONCE DAILY IN THE EVENING. 90 capsule 11     folic acid (FOLVITE) 1 MG tablet Take 2 tablets (2 mg) by mouth daily 60 tablet 11     Prenatal Vit-Fe Fumarate-FA (PRENATAL MULTIVITAMIN W/IRON) 27-0.8 MG tablet Take 1 tablet by mouth every evening       rOPINIRole (REQUIP) 0.5 MG tablet Take 1 tablet (0.5 mg) by mouth At Bedtime Call clinic to schedule follow up appointment. 30 tablet 11     sertraline (ZOLOFT) 50 MG tablet 50 mg daily            ALLERGIES: No known drug allergies.   SOCIAL HISTORY: She is single, living with her mother. No smoking, no alcohol, no drug abuse.  She is in college, major in social work.  She is staying home taking care of her child.    REVIEW OF SYSTEMS: Negative.  PREVIOUS DIAGNOSTIC TESTING: She had an MRI and EEG studies over 10 years ago. Results are not available at this time.      PHYSICAL EXAMINATION:    AAO x 3, speech fluent and appropriate.     Labs:     Component      Latest Ref Rng & Units 1/6/2018   Carbamazepine Total Level      4.0 - 12.0 ug/mL 9.7   10, 11 Epoxide Level      0.4 - 4.0 ug/mL 1.4   Free Carbamazepine Level Ug/Ml      0.6 - 4.2 ug/mL 1.8   Free Epoxide Level      0.2 - 2.0 ug/mL 0.6      IMPRESSION:   1. Epilepsy.      Since the last visit about 6 months ago,  she had no seizures. Her last seizure was in Dec, 2018.  She is currently taking Carbamazepine 300 mg - 600 mg.  No side effect reported.  She is compliant with her medications.     2. Restless leg syndrome. Improved. Continue on Requip.  She is  currently on Requip 05 mg qhs, and it is working very well to control her symptoms.  She also mentioned that exercise helped a lot for her RLS.  If she does not exercise, she will have a lot of problems with RLS.     PLAN:   1. Continue carbamazepine 300 mg in a.m. 600 mg p.m.    2. Continue Requip 0.5 mg qhs.  3. Folic acid 2 mg qd.  4. RTC in 12 months.    Time on the phone: 12 min

## 2022-04-07 NOTE — LETTER
"2022     RE: Tita Prieto  : 1995   MRN: [id number]      Dear Colleague,    Thank you for referring your patient, Tita Prieto, to the [facility]LE[facility] at [facility]. Please see a copy of my visit note below.    Tita is a 26 year old who is being evaluated via a billable phone visit.      Time on the phone: 12 min    CC: Follow up for seizures.     HPI: Telephone call for follow up.  She is a 26-year-old, right-handed female with a history of seizures since .  Since the last visit about 6 months ago,  she had no seizures. Her last seizure was in Dec, 2018, likely due to sleep deprivation and with URI for a few days around that time. She daughter is 2 years old, baby was healthy, no issues. Her most recently Tegretol level in 2020 was 10.1.  Most recent Na was 138.  She is currently taking Carbamazepine 300 mg - 600 mg.  No side effect reported.  She is compliant with her medications.      She tried Keppra 750 mg bid in the past.  She could not tolerate the keppra.   She was also diagnosed with \"restless leg syndrome\".  She is currently on Requip 05 mg qhs, and it is working very well to control her symptoms.  She also mentioned that exercise helped a lot for her RLS.  If she does not exercise, she will have a lot of problems with RLS.     Her seizures started in . Her typical seizures are described as she has an aura of right arm and leg tingling achy sensation, then followed by right arm and leg shaking arrhythmically which lasts for about 30 seconds to 1 minute. She was started on carbamazepine many years ago, and her seizures were well controlled until 2009 when she was trying to wean herself off the carbamazepine, then she started seizure recurrence. She had a series of a generalized tonic-clonic seizures at that time, so she was put back on carbamazepine. Her seizures usually occur in the early morning. In 2011, when she was " seeing Dr. Villa, he was trying to wean the patient off the carbamazepine again, and in February, after she was off the medication for a week, then she had another simple partial seizure with the right arm and the leg jerking for about 1 minute,   RISK FACTORS FOR SEIZURES: She had no history of head trauma with loss of consciousness. No history of CNS infections. No history of febrile convulsions.    TRIGGERS FOR SEIZURES Unknown.   PAST MEDICAL HISTORY: None.   PAST SURGICAL HISTORY: None.   FAMILY HISTORY: No family history of seizures. Grandmother had a history of diabetes. Aunt had a history of brain hemorrhage. Mother and sister have RLS.       Current Outpatient Medications   Medication Sig Dispense Refill     carBAMazepine (CARBATROL) 300 MG 12 hr capsule TAKE 1 CAPSULE BY MOUTH ONCE DAILY IN THE MORNING AND 2 CAPSULES ONCE DAILY IN THE EVENING. 90 capsule 11     folic acid (FOLVITE) 1 MG tablet Take 2 tablets (2 mg) by mouth daily 60 tablet 11     Prenatal Vit-Fe Fumarate-FA (PRENATAL MULTIVITAMIN W/IRON) 27-0.8 MG tablet Take 1 tablet by mouth every evening       rOPINIRole (REQUIP) 0.5 MG tablet Take 1 tablet (0.5 mg) by mouth At Bedtime Call clinic to schedule follow up appointment. 30 tablet 11     sertraline (ZOLOFT) 50 MG tablet 50 mg daily            ALLERGIES: No known drug allergies.   SOCIAL HISTORY: She is single, living with her mother. No smoking, no alcohol, no drug abuse.  She is in college, major in social work.  She is staying home taking care of her child.    REVIEW OF SYSTEMS: Negative.  PREVIOUS DIAGNOSTIC TESTING: She had an MRI and EEG studies over 10 years ago. Results are not available at this time.      PHYSICAL EXAMINATION:    AAO x 3, speech fluent and appropriate.     Labs:     Component      Latest Ref Rng & Units 1/6/2018   Carbamazepine Total Level      4.0 - 12.0 ug/mL 9.7   10, 11 Epoxide Level      0.4 - 4.0 ug/mL 1.4   Free Carbamazepine Level Ug/Ml      0.6 - 4.2 ug/mL  1.8   Free Epoxide Level      0.2 - 2.0 ug/mL 0.6      IMPRESSION:   1. Epilepsy.      Since the last visit about 6 months ago,  she had no seizures. Her last seizure was in Dec, 2018.  She is currently taking Carbamazepine 300 mg - 600 mg.  No side effect reported.  She is compliant with her medications.     2. Restless leg syndrome. Improved. Continue on Requip.  She is currently on Requip 05 mg qhs, and it is working very well to control her symptoms.  She also mentioned that exercise helped a lot for her RLS.  If she does not exercise, she will have a lot of problems with RLS.     PLAN:   1. Continue carbamazepine 300 mg in a.m. 600 mg p.m.    2. Continue Requip 0.5 mg qhs.  3. Folic acid 2 mg qd.  4. RTC in 12 months.    Time on the phone: 12 min    Again, thank you for allowing me to participate in the care of your patient.      Sincerely,    Maris Otero MD

## 2022-04-11 RX ORDER — ROPINIROLE 0.5 MG/1
TABLET, FILM COATED ORAL
Qty: 90 TABLET | OUTPATIENT
Start: 2022-04-11

## 2022-04-18 ENCOUNTER — APPOINTMENT (OUTPATIENT)
Dept: ULTRASOUND IMAGING | Facility: CLINIC | Age: 27
End: 2022-04-18
Attending: FAMILY MEDICINE
Payer: OTHER GOVERNMENT

## 2022-04-18 ENCOUNTER — HOSPITAL ENCOUNTER (EMERGENCY)
Facility: CLINIC | Age: 27
Discharge: HOME | End: 2022-04-18
Attending: FAMILY MEDICINE | Admitting: FAMILY MEDICINE
Payer: OTHER GOVERNMENT

## 2022-04-18 VITALS
BODY MASS INDEX: 40.92 KG/M2 | OXYGEN SATURATION: 99 % | HEART RATE: 82 BPM | RESPIRATION RATE: 16 BRPM | TEMPERATURE: 97.2 F | SYSTOLIC BLOOD PRESSURE: 140 MMHG | DIASTOLIC BLOOD PRESSURE: 66 MMHG | HEIGHT: 68 IN | WEIGHT: 270 LBS

## 2022-04-18 DIAGNOSIS — O20.0 THREATENED ABORTION: ICD-10-CM

## 2022-04-18 LAB
ALBUMIN UR-MCNC: NEGATIVE MG/DL
ANION GAP SERPL CALCULATED.3IONS-SCNC: 6 MMOL/L (ref 3–14)
APPEARANCE UR: CLEAR
B-HCG SERPL-ACNC: ABNORMAL IU/L (ref 0–5)
BACTERIA #/AREA URNS HPF: ABNORMAL /HPF
BASOPHILS # BLD AUTO: 0 10E3/UL (ref 0–0.2)
BASOPHILS NFR BLD AUTO: 0 %
BILIRUB UR QL STRIP: NEGATIVE
BUN SERPL-MCNC: 10 MG/DL (ref 7–30)
CALCIUM SERPL-MCNC: 8.6 MG/DL (ref 8.5–10.1)
CHLORIDE BLD-SCNC: 104 MMOL/L (ref 94–109)
CO2 SERPL-SCNC: 25 MMOL/L (ref 20–32)
COLOR UR AUTO: YELLOW
CREAT SERPL-MCNC: 0.49 MG/DL (ref 0.52–1.04)
EOSINOPHIL # BLD AUTO: 0.1 10E3/UL (ref 0–0.7)
EOSINOPHIL NFR BLD AUTO: 1 %
ERYTHROCYTE [DISTWIDTH] IN BLOOD BY AUTOMATED COUNT: 12.3 % (ref 10–15)
GFR SERPL CREATININE-BSD FRML MDRD: >90 ML/MIN/1.73M2
GLUCOSE BLD-MCNC: 86 MG/DL (ref 70–99)
GLUCOSE UR STRIP-MCNC: NEGATIVE MG/DL
HCT VFR BLD AUTO: 41.6 % (ref 35–47)
HGB BLD-MCNC: 13.7 G/DL (ref 11.7–15.7)
HGB UR QL STRIP: ABNORMAL
HOLD SPECIMEN: NORMAL
IMM GRANULOCYTES # BLD: 0 10E3/UL
IMM GRANULOCYTES NFR BLD: 0 %
KETONES UR STRIP-MCNC: NEGATIVE MG/DL
LEUKOCYTE ESTERASE UR QL STRIP: ABNORMAL
LYMPHOCYTES # BLD AUTO: 2 10E3/UL (ref 0.8–5.3)
LYMPHOCYTES NFR BLD AUTO: 26 %
MCH RBC QN AUTO: 29.5 PG (ref 26.5–33)
MCHC RBC AUTO-ENTMCNC: 32.9 G/DL (ref 31.5–36.5)
MCV RBC AUTO: 90 FL (ref 78–100)
MONOCYTES # BLD AUTO: 0.5 10E3/UL (ref 0–1.3)
MONOCYTES NFR BLD AUTO: 6 %
MUCOUS THREADS #/AREA URNS LPF: PRESENT /LPF
NEUTROPHILS # BLD AUTO: 5.2 10E3/UL (ref 1.6–8.3)
NEUTROPHILS NFR BLD AUTO: 67 %
NITRATE UR QL: NEGATIVE
NRBC # BLD AUTO: 0 10E3/UL
NRBC BLD AUTO-RTO: 0 /100
PH UR STRIP: 7 [PH] (ref 5–7)
PLATELET # BLD AUTO: 324 10E3/UL (ref 150–450)
POTASSIUM BLD-SCNC: 3.7 MMOL/L (ref 3.4–5.3)
RBC # BLD AUTO: 4.64 10E6/UL (ref 3.8–5.2)
RBC URINE: 1 /HPF
SODIUM SERPL-SCNC: 135 MMOL/L (ref 133–144)
SP GR UR STRIP: 1.02 (ref 1–1.03)
SQUAMOUS EPITHELIAL: 5 /HPF
UROBILINOGEN UR STRIP-MCNC: NORMAL MG/DL
WBC # BLD AUTO: 7.6 10E3/UL (ref 4–11)
WBC URINE: 1 /HPF

## 2022-04-18 PROCEDURE — 82310 ASSAY OF CALCIUM: CPT | Performed by: FAMILY MEDICINE

## 2022-04-18 PROCEDURE — 36415 COLL VENOUS BLD VENIPUNCTURE: CPT | Performed by: FAMILY MEDICINE

## 2022-04-18 PROCEDURE — 99285 EMERGENCY DEPT VISIT HI MDM: CPT | Performed by: FAMILY MEDICINE

## 2022-04-18 PROCEDURE — 81003 URINALYSIS AUTO W/O SCOPE: CPT | Performed by: FAMILY MEDICINE

## 2022-04-18 PROCEDURE — 76801 OB US < 14 WKS SINGLE FETUS: CPT

## 2022-04-18 PROCEDURE — 99284 EMERGENCY DEPT VISIT MOD MDM: CPT | Mod: 25 | Performed by: FAMILY MEDICINE

## 2022-04-18 PROCEDURE — 84702 CHORIONIC GONADOTROPIN TEST: CPT | Performed by: FAMILY MEDICINE

## 2022-04-18 PROCEDURE — 85025 COMPLETE CBC W/AUTO DIFF WBC: CPT | Performed by: FAMILY MEDICINE

## 2022-04-18 NOTE — ED PROVIDER NOTES
History     Chief Complaint   Patient presents with     Vaginal Bleeding     9 wks preg, vaginal bleeding, has been ongoing, but worse since last night     HPI  Tita Prieto is a 26 year old female G3, SA 1, P1 now at 9+ week gestation with concerns of vaginal bleeding.  History is obtained from the patient who identifies spotting throughout the entire pregnancy however last night more or less continuous vaginal bleeding without cramping abdominal pain flank pain or pelvic pain.  No nausea or vomiting.  No recent trauma or injury.  Eating and drinking normally.  Constipated since onset of pregnancy, no urinary tract symptoms such as frequency, urgency or dysuria.  The patient is documented A positive on reviewing the EHR.    Ultrasound had been obtained in clinic back on 4/8/2022 reviewed below.    1. Single, living intrauterine pregnancy at 8 weeks 1 days.   2. BIGG by Ultrasound is 11/17/2022.   3. The left ovary has a 2.6 cm simple cyst.     REFERENCE:   Management of Asymptomatic Ovarian and Other Adnexal Cysts Imaged at US:   Society of Radiologists in Ultrasound Consensus Conference Statement.   Radiology September 2010; [id number].       SIMPLE CYSTS:   Premenopausal:   Less than or equal to 3 cm: Normal physiologic finding.   3-5 cm: No follow up needed.   5-7 cm: Yearly follow up.   Greater than 7 cm: Further workup; MRI or surgical.       Diana Grimes MD 4/8/2022 4:55 PM      Allergies:  Allergies   Allergen Reactions     Influenza Vaccines Other (See Comments)     Has history of Guillain [place] syndrome     Shellfish-Derived Products Other (See Comments)     Mouth tingles       Problem List:    Patient Active Problem List    Diagnosis Date Noted     Seizure (H) 12/10/2018     Priority: Medium     Morbid obesity with BMI of 40.0-44.9, adult (H) 01/12/2017     Priority: Medium     Irritable bowel syndrome with diarrhea 01/12/2017     Priority: Medium     Overview:   1/2017: Fiber       Nicola  "Barré syndrome (H) 2016     Priority: Medium     Overview:   ?? 2015 [facility] [place] hospital visit. Avoiding flu vaccines.       Hand weakness 2015     Priority: Medium     Contraception 2015     Priority: Medium     Overview:   Nexplanon placed 2015       Mixed hyperlipidemia 2014     Priority: Medium     Overview:     Last Lipids:  Last Lipids:  Chol: 2016 207   101  HDL: 2016 49   LDL: 2016 138  LDL DIRECT: . No results found in past 5 years       Restless legs syndrome (RLS) 2013     Priority: Medium     Overview:   : Started on Requip  She is on the 0.25mg at bedtime/night and is able to get 6 hours of sleep.       Seizure disorder (H) 2013     Priority: Medium     Overview:   Carbamezapine 300mg am and 600mg pm.   Sees neurologist  Last seizure episode- 10/2015          Past Medical History:    Past Medical History:   Diagnosis Date     Seizures (H)        Past Surgical History:    Past Surgical History:   Procedure Laterality Date     SURGICAL HISTORY OF -   1994    PE tubes       Family History:    Family History   Problem Relation Age of Onset     No Known Problems Mother      No Known Problems Father        Social History:  Marital Status:   [2]  Social History     Tobacco Use     Smoking status: Never Smoker     Smokeless tobacco: Never Used   Substance Use Topics     Alcohol use: No     Drug use: No        Medications:    carBAMazepine (CARBATROL) 300 MG 12 hr capsule  folic acid (FOLVITE) 1 MG tablet  Prenatal Vit-Fe Fumarate-FA (PRENATAL MULTIVITAMIN W/IRON) 27-0.8 MG tablet  rOPINIRole (REQUIP) 0.5 MG tablet  sertraline (ZOLOFT) 50 MG tablet          Review of Systems   All other systems reviewed and are negative.      Physical Exam   BP: (!) 150/81  Pulse: 81  Temp: 97.2  F (36.2  C)  Resp: 16  Height: 172.7 cm (5' 8\")  Weight: 122.5 kg (270 lb)  SpO2: 100 %      Physical Exam  Vitals and nursing " note reviewed.   Constitutional:       General: She is not in acute distress.     Appearance: Normal appearance. She is obese. She is not ill-appearing.   HENT:      Head: Normocephalic and atraumatic.      Right Ear: Tympanic membrane, ear canal and external ear normal.      Left Ear: Tympanic membrane, ear canal and external ear normal.      Nose: Nose normal.      Mouth/Throat:      Mouth: Mucous membranes are dry.      Pharynx: Oropharynx is clear.   Eyes:      Extraocular Movements: Extraocular movements intact.      Conjunctiva/sclera: Conjunctivae normal.      Pupils: Pupils are equal, round, and reactive to light.   Cardiovascular:      Rate and Rhythm: Normal rate and regular rhythm.      Pulses: Normal pulses.      Heart sounds: Normal heart sounds.   Pulmonary:      Effort: Pulmonary effort is normal.      Breath sounds: Normal breath sounds.   Abdominal:      General: Bowel sounds are normal.      Palpations: Abdomen is soft.   Musculoskeletal:         General: Normal range of motion.      Cervical back: Normal range of motion and neck supple.   Skin:     General: Skin is warm and dry.      Capillary Refill: Capillary refill takes less than 2 seconds.   Neurological:      General: No focal deficit present.      Mental Status: She is alert and oriented to person, place, and time.   Psychiatric:         Mood and Affect: Mood normal.         Behavior: Behavior normal.         ED Course                 Procedures              Critical Care time:  none               Results for orders placed or performed during the hospital encounter of 04/18/22 (from the past 24 hour(s))   CBC with platelets, differential    Narrative    The following orders were created for panel order CBC with platelets, differential.  Procedure                               Abnormality         Status                     ---------                               -----------         ------                     CBC with platelets and  d...[[id number]]                      Final result                 Please view results for these tests on the individual orders.   Basic metabolic panel   Result Value Ref Range    Sodium 135 133 - 144 mmol/L    Potassium 3.7 3.4 - 5.3 mmol/L    Chloride 104 94 - 109 mmol/L    Carbon Dioxide (CO2) 25 20 - 32 mmol/L    Anion Gap 6 3 - 14 mmol/L    Urea Nitrogen 10 7 - 30 mg/dL    Creatinine 0.49 (L) 0.52 - 1.04 mg/dL    Calcium 8.6 8.5 - 10.1 mg/dL    Glucose 86 70 - 99 mg/dL    GFR Estimate >90 >60 mL/min/1.73m2   HCG quantitative pregnancy (blood)   Result Value Ref Range    hCG Quantitative 35,294 (H) 0 - 5 IU/L   CBC with platelets and differential   Result Value Ref Range    WBC Count 7.6 4.0 - 11.0 10e3/uL    RBC Count 4.64 3.80 - 5.20 10e6/uL    Hemoglobin 13.7 11.7 - 15.7 g/dL    Hematocrit 41.6 35.0 - 47.0 %    MCV 90 78 - 100 fL    MCH 29.5 26.5 - 33.0 pg    MCHC 32.9 31.5 - 36.5 g/dL    RDW 12.3 10.0 - 15.0 %    Platelet Count 324 150 - 450 10e3/uL    % Neutrophils 67 %    % Lymphocytes 26 %    % Monocytes 6 %    % Eosinophils 1 %    % Basophils 0 %    % Immature Granulocytes 0 %    NRBCs per 100 WBC 0 <1 /100    Absolute Neutrophils 5.2 1.6 - 8.3 10e3/uL    Absolute Lymphocytes 2.0 0.8 - 5.3 10e3/uL    Absolute Monocytes 0.5 0.0 - 1.3 10e3/uL    Absolute Eosinophils 0.1 0.0 - 0.7 10e3/uL    Absolute Basophils 0.0 0.0 - 0.2 10e3/uL    Absolute Immature Granulocytes 0.0 <=0.4 10e3/uL    Absolute NRBCs 0.0 10e3/uL   Extra Tube    Narrative    The following orders were created for panel order Extra Tube.  Procedure                               Abnormality         Status                     ---------                               -----------         ------                     Extra Red Top Tube[[id number]]                               In process                   Please view results for these tests on the individual orders.   OB < 14 weeks single or first gestation US    Narrative    ULTRASOUND OBSTETRIC LESS  THAN FOURTEEN WEEKS SINGLE, TRANSABDOMINAL  IMAGING  4/18/2022 1:40 PM    HISTORY: Pregnancy. First trimester vaginal bleeding.    COMPARISON: None.    FINDINGS:  There is a single live intrauterine pregnancy of  approximately 9 weeks 5 days gestation. The EDC based on this  ultrasound is 11/16/2022. Fetal heart rate is 167 beats per minute.  Fetal anatomic detail is limited due to early gestational age with no  gross abnormality seen. Amniotic fluid is unable to be assessed due to  early gestational age. Placenta is not yet adequately visible due to  early gestational age. Subchorionic hemorrhage measures 1.4 x 1.0 x  1.4 cm.     Maternal adnexa: Right ovarian cyst is 1.5 cm. Left ovarian cyst is  3.4 cm.    Patient reported LMP: 11/17/2022  LMP gestational age: 9 weeks 4 days      Impression    IMPRESSION:   1. Early single live intrauterine pregnancy of approximately 9 weeks 5  days gestation.  2. Subchorionic hemorrhage identified.  3. Bilateral ovarian cysts. Dominant cyst at the left ovary is 3.4 cm.     2:22 PM  Reviewed diagnostics in the room with the patient and answered her questions.  We discussed subchorionic hemorrhage and the potential implications should this progress.  I recommended follow-up in 48 hours for repeat quantitative hCG and clinic visit with her primary provider Dr. Wendy Cueva.  Return to the emergency department in the interim if worse or changes.    Medications - No data to display    Assessments & Plan (with Medical Decision Making)   Assessment and plan with medical decision making at the time stamp above.    Disclaimer: This note consists of symbols derived from keyboarding, dictation and/or voice recognition software. As a result, there may be errors in the script that have gone undetected. Please consider this when interpreting information found in this chart.      I have reviewed the nursing notes.    I have reviewed the findings, diagnosis, plan and need for follow up with  the patient.          New Prescriptions    No medications on file       Final diagnoses:   Threatened        2022   [facility] EMERGENCY DEPT     Lv Ruiz MD  [phone]

## 2022-04-18 NOTE — DISCHARGE INSTRUCTIONS
Push fluids, rest.  Repeat quantitative hCG with your primary care provider/OB provider in 48 hours.  Return to the emergency department if worse or changes.

## 2022-08-14 ENCOUNTER — HEALTH MAINTENANCE LETTER (OUTPATIENT)
Age: 27
End: 2022-08-14

## 2022-11-03 DIAGNOSIS — G40.909 SEIZURE DISORDER (H): ICD-10-CM

## 2022-11-03 RX ORDER — CARBAMAZEPINE 300 MG/1
CAPSULE, EXTENDED RELEASE ORAL
Qty: 90 CAPSULE | Refills: 4 | Status: SHIPPED | OUTPATIENT
Start: 2022-11-03 | End: 2023-03-16

## 2022-11-03 NOTE — TELEPHONE ENCOUNTER
"Carbamazepine refill request  Chart reviewed.  Last office visit with  4/7/2022 (Virtual)  Next scheduled appointment 3/9/2023  \"PLAN:   1. Continue carbamazepine 300 mg in a.m. 600 mg p.m.    2. Continue Requip 0.5 mg qhs.  3. Folic acid 2 mg qd.  4. RTC in 12 months.\"      Refill provided per protocol  "

## 2022-11-19 ENCOUNTER — HEALTH MAINTENANCE LETTER (OUTPATIENT)
Age: 27
End: 2022-11-19

## 2022-12-05 ENCOUNTER — TELEPHONE (OUTPATIENT)
Dept: NEUROLOGY | Facility: CLINIC | Age: 27
End: 2022-12-05

## 2023-02-01 ENCOUNTER — TELEPHONE (OUTPATIENT)
Dept: NEUROLOGY | Facility: CLINIC | Age: 28
End: 2023-02-01

## 2023-02-01 NOTE — TELEPHONE ENCOUNTER
Prior Authorization Retail Medication Request    Medication/Dose: Carbamazepine  MG CAP  ICD code (if different than what is on RX):    Previously Tried and Failed:  See Chart  Rationale:  See Chart    Insurance Name:    Insurance ID:        Pharmacy Information (if different than what is on RX)  Name:    Phone:

## 2023-02-03 NOTE — TELEPHONE ENCOUNTER
Central Prior Authorization Team   Phone: [phone]    Patient's Medicaid ID# [id number]      PA Initiation    Medication: Carbamazepine  MG CAP  Insurance Company: [place] Medicaid ([facility]) - Phone [phone] Fax [phone]  Pharmacy Filling the Rx: [facility] [address] [place], [place] - [address]  Filling Pharmacy Phone: [phone]  Filling Pharmacy Fax:    Start Date: 2/3/2023

## 2023-02-07 NOTE — TELEPHONE ENCOUNTER
PA team received request from clinic to start PA through patient's primary insurance, . I called pharmacy to process script through  so we can start the PA. Per pharmacy tech, [facility] pharmacy is not contracted with , so they cannot bill . That is why they were requesting PA through [place]. PA was submitted to [place], but was denied.

## 2023-02-07 NOTE — TELEPHONE ENCOUNTER
PRIOR AUTHORIZATION DENIED    Medication: Carbamazepine  MG CAP    Denial Date: 2/6/2023    Denial Rational: See below for formulary alternatives. Patient need to try at least 2 preferred alternatives.               Appeal Information:

## 2023-02-14 NOTE — TELEPHONE ENCOUNTER
Patient was notified that insurance isn't contracted through [employer] and denied coverage. She states she is getting her medication through [facility]. I do see now that the original request came from [facility] in [place].     [facility] [employer]  [address] [place] [address] [phone]  Fax: [phone]

## 2023-02-14 NOTE — TELEPHONE ENCOUNTER
Resubmitted to Medicaid for correct dispensing pharmacy, [facility].

## 2023-02-20 NOTE — TELEPHONE ENCOUNTER
PRIOR AUTHORIZATION DENIED    Medication: Carbamazepine  MG CAP    Denial Date: 2/17/2023    Denial Rational: link to covered alternatives, [email or web address]          Appeal Information:

## 2023-02-27 DIAGNOSIS — G25.81 RESTLESS LEG SYNDROME: ICD-10-CM

## 2023-03-02 RX ORDER — ROPINIROLE 0.5 MG/1
0.5 TABLET, FILM COATED ORAL AT BEDTIME
Qty: 30 TABLET | Refills: 11 | OUTPATIENT
Start: 2023-03-02

## 2023-03-08 ENCOUNTER — TELEPHONE (OUTPATIENT)
Dept: NEUROLOGY | Facility: CLINIC | Age: 28
End: 2023-03-08

## 2023-03-08 NOTE — TELEPHONE ENCOUNTER
Received DMV Form to be completed. Form saved to [email or web address], encounter routed.  Claudia Cruz CMA

## 2023-03-14 NOTE — TELEPHONE ENCOUNTER
DMV form signed, faxed and mailed  to [employer] on 03/14/2023, sent to scanning, and copy mailed to patient.

## 2023-03-15 ASSESSMENT — PATIENT HEALTH QUESTIONNAIRE - PHQ9
SUM OF ALL RESPONSES TO PHQ QUESTIONS 1-9: 0
SUM OF ALL RESPONSES TO PHQ QUESTIONS 1-9: 0
10. IF YOU CHECKED OFF ANY PROBLEMS, HOW DIFFICULT HAVE THESE PROBLEMS MADE IT FOR YOU TO DO YOUR WORK, TAKE CARE OF THINGS AT HOME, OR GET ALONG WITH OTHER PEOPLE: NOT DIFFICULT AT ALL

## 2023-03-16 ENCOUNTER — VIRTUAL VISIT (OUTPATIENT)
Dept: NEUROLOGY | Facility: CLINIC | Age: 28
End: 2023-03-16
Payer: MEDICAID

## 2023-03-16 VITALS — HEIGHT: 68 IN | WEIGHT: 270 LBS | BODY MASS INDEX: 40.92 KG/M2

## 2023-03-16 DIAGNOSIS — G25.81 RESTLESS LEG SYNDROME: ICD-10-CM

## 2023-03-16 DIAGNOSIS — G40.909 SEIZURE DISORDER (H): ICD-10-CM

## 2023-03-16 RX ORDER — FOLIC ACID 1 MG/1
2 TABLET ORAL DAILY
Qty: 180 TABLET | Refills: 3 | Status: SHIPPED | OUTPATIENT
Start: 2023-03-16 | End: 2024-04-02

## 2023-03-16 RX ORDER — ROPINIROLE 0.5 MG/1
0.5 TABLET, FILM COATED ORAL AT BEDTIME
Qty: 90 TABLET | Refills: 3 | Status: SHIPPED | OUTPATIENT
Start: 2023-03-16 | End: 2024-03-21

## 2023-03-16 RX ORDER — CARBAMAZEPINE 300 MG/1
CAPSULE, EXTENDED RELEASE ORAL
Qty: 270 CAPSULE | Refills: 3 | Status: SHIPPED | OUTPATIENT
Start: 2023-03-16 | End: 2023-03-17

## 2023-03-16 RX ORDER — SERTRALINE HYDROCHLORIDE 100 MG/1
TABLET, FILM COATED ORAL
COMMUNITY
Start: 2023-02-27

## 2023-03-16 ASSESSMENT — PAIN SCALES - GENERAL: PAINLEVEL: NO PAIN (0)

## 2023-03-16 NOTE — PROGRESS NOTES
"CC: Follow up for seizures.     HPI: Video call for follow up.  She is a 27-year-old, right-handed female with a history of seizures since 2000.  Since the last visit about 12 months ago,  she had no seizures. Her last seizure was in Dec, 2018, she had a focal aware seizures lasted for about 2 hours with right arm jerking, likely due to sleep deprivation and with URI for a few days around that time. She daughter is 3 years old, healthy, no issues. Her most recently Tegretol level in Nov. 2022 was 7.6.  Most recent Na was 137.  She is currently taking Carbamazepine 300 mg - 600 mg.  No side effect reported.  She is compliant with her medications.      She is taking Ropinirole 0.5 mg at bedtime for restless leg syndrome. No complains. No side effects.    She tried Keppra 750 mg bid in the past.  She could not tolerate the keppra.   She was also diagnosed with \"restless leg syndrome\".  She is currently on Requip 05 mg qhs, and it is working very well to control her symptoms.  She also mentioned that exercise helped a lot for her RLS.  If she does not exercise, she will have a lot of problems with RLS.     Her seizures started in 2000. Her typical seizures are described as she has an aura of right arm and leg tingling achy sensation, then followed by right arm and leg shaking arrhythmically which lasts for about 30 seconds to 1 minute. She was started on carbamazepine many years ago, and her seizures were well controlled until 05/2009 when she was trying to wean herself off the carbamazepine, then she started seizure recurrence. She had a series of a generalized tonic-clonic seizures at that time, so she was put back on carbamazepine. Her seizures usually occur in the early morning. In 12/2011, when she was seeing Dr. Villa, he was trying to wean the patient off the carbamazepine again, and in February, after she was off the medication for a week, then she had another simple partial seizure with the right arm and the " leg jerking for about 1 minute,   RISK FACTORS FOR SEIZURES: She had no history of head trauma with loss of consciousness. No history of CNS infections. No history of febrile convulsions.    TRIGGERS FOR SEIZURES Unknown.   PAST MEDICAL HISTORY: None.   PAST SURGICAL HISTORY: None.   FAMILY HISTORY: No family history of seizures. Grandmother had a history of diabetes. Aunt had a history of brain hemorrhage. Mother and sister have RLS.       Current Outpatient Medications   Medication Sig Dispense Refill     carBAMazepine (CARBATROL) 300 MG 12 hr capsule Take 1 capsule (300 mg) by mouth every morning AND 2 capsules (600 mg) every evening. 90 capsule 4     Prenatal Vit-Fe Fumarate-FA (PRENATAL MULTIVITAMIN W/IRON) 27-0.8 MG tablet Take 1 tablet by mouth every evening       rOPINIRole (REQUIP) 0.5 MG tablet Take 1 tablet (0.5 mg) by mouth At Bedtime Call clinic to schedule follow up appointment. 30 tablet 11     sertraline (ZOLOFT) 100 MG tablet TAKE 1 TABLET (100 MG) BY MOUTH EVERY MORNING. FOR MOOD.       folic acid (FOLVITE) 1 MG tablet Take 2 tablets (2 mg) by mouth daily (Patient not taking: Reported on 3/16/2023) 60 tablet 11     sertraline (ZOLOFT) 50 MG tablet 50 mg daily  (Patient not taking: Reported on 3/16/2023)           ALLERGIES: No known drug allergies.   SOCIAL HISTORY: She is single, living with her mother. No smoking, no alcohol, no drug abuse.  She is in college, major in social work.  She is staying home taking care of her child.    REVIEW OF SYSTEMS: Negative.  PREVIOUS DIAGNOSTIC TESTING: She had an MRI and EEG studies over 10 years ago. Results are not available at this time.      PHYSICAL EXAMINATION:    AAO x 3, speech fluent and appropriate.     Labs:     Component      Latest Ref Rng & Units 1/6/2018   Carbamazepine Total Level      4.0 - 12.0 ug/mL 9.7   10, 11 Epoxide Level      0.4 - 4.0 ug/mL 1.4   Free Carbamazepine Level Ug/Ml      0.6 - 4.2 ug/mL 1.8   Free Epoxide Level      0.2 -  2.0 ug/mL 0.6      IMPRESSION:   1. Epilepsy.      Since the last visit about 12 months ago,  she had no seizures. Her last seizure was in Dec, 2018. Her most recently Tegretol level in Nov. 2022 was 7.6.  Most recent Na was 137.  She is currently taking Carbamazepine 300 mg - 600 mg.  No side effect reported. She is compliant with her medications.     2. Restless leg syndrome. Improved. On Ropinirole 0.5 mg at bedtime. Continue on Requip.  She is currently on Requip 05 mg qhs, and it is working very well to control her symptoms.  She also mentioned that exercise helped a lot for her RLS.  If she does not exercise, she will have a lot of problems with RLS.     PLAN:   1. Continue carbamazepine 300 mg in a.m. 600 mg p.m.    2. Continue Requip 0.5 mg qhs.  3. Folic acid 2 mg qd.  4. RTC in 12 months.      20 min total time was spent on the day of this visit.      10 min was spent on face to face time  10 min was spent on preparation of visit to review charts and labs, ordering medications and tests, and documentation of clinical information

## 2023-03-16 NOTE — NURSING NOTE
Is the patient currently in the state of [place]? YES    Visit mode:VIDEO    If the visit is dropped, the patient can be reconnected by: VIDEO VISIT: Send to e-mail at: [email or web address]    Will anyone else be joining the visit? NO      How would you like to obtain your AVS? MyChart    Are changes needed to the allergy or medication list? NO    Reason for visit:   Follow up    Natalie Benton, VF

## 2023-03-16 NOTE — LETTER
"3/16/2023     RE: Tita Prieto  : 1995   MRN: [id number]      Dear Colleague,    Thank you for referring your patient, Tita Prieto, to the [facility]PILE[facility] at [facility]. Please see a copy of my visit note below.    Video-Visit Details    Type of service:  Video Visit    Video Start Time (time video started): 08:06 am    Video End Time (time video stopped): 08:16 am    Originating Location (pt. Location): Home    Distant Location (provider location):  On-site    Mode of Communication:  Video Conference via Evocalize      CC: Follow up for seizures.     HPI: Video call for follow up.  She is a 27-year-old, right-handed female with a history of seizures since .  Since the last visit about 12 months ago,  she had no seizures. Her last seizure was in Dec, 2018, she had a focal aware seizures lasted for about 2 hours with right arm jerking, likely due to sleep deprivation and with URI for a few days around that time. She daughter is 3 years old, healthy, no issues. Her most recently Tegretol level in 2022 was 7.6.  Most recent Na was 137.  She is currently taking Carbamazepine 300 mg - 600 mg.  No side effect reported.  She is compliant with her medications.      She is taking Ropinirole 0.5 mg at bedtime for restless leg syndrome. No complains. No side effects.    She tried Keppra 750 mg bid in the past.  She could not tolerate the keppra.   She was also diagnosed with \"restless leg syndrome\".  She is currently on Requip 05 mg qhs, and it is working very well to control her symptoms.  She also mentioned that exercise helped a lot for her RLS.  If she does not exercise, she will have a lot of problems with RLS.     Her seizures started in . Her typical seizures are described as she has an aura of right arm and leg tingling achy sensation, then followed by right arm and leg shaking arrhythmically which lasts for about 30 seconds to 1 minute. " She was started on carbamazepine many years ago, and her seizures were well controlled until 05/2009 when she was trying to wean herself off the carbamazepine, then she started seizure recurrence. She had a series of a generalized tonic-clonic seizures at that time, so she was put back on carbamazepine. Her seizures usually occur in the early morning. In 12/2011, when she was seeing Dr. Villa, he was trying to wean the patient off the carbamazepine again, and in February, after she was off the medication for a week, then she had another simple partial seizure with the right arm and the leg jerking for about 1 minute,   RISK FACTORS FOR SEIZURES: She had no history of head trauma with loss of consciousness. No history of CNS infections. No history of febrile convulsions.    TRIGGERS FOR SEIZURES Unknown.   PAST MEDICAL HISTORY: None.   PAST SURGICAL HISTORY: None.   FAMILY HISTORY: No family history of seizures. Grandmother had a history of diabetes. Aunt had a history of brain hemorrhage. Mother and sister have RLS.       Current Outpatient Medications   Medication Sig Dispense Refill     carBAMazepine (CARBATROL) 300 MG 12 hr capsule Take 1 capsule (300 mg) by mouth every morning AND 2 capsules (600 mg) every evening. 90 capsule 4     Prenatal Vit-Fe Fumarate-FA (PRENATAL MULTIVITAMIN W/IRON) 27-0.8 MG tablet Take 1 tablet by mouth every evening       rOPINIRole (REQUIP) 0.5 MG tablet Take 1 tablet (0.5 mg) by mouth At Bedtime Call clinic to schedule follow up appointment. 30 tablet 11     sertraline (ZOLOFT) 100 MG tablet TAKE 1 TABLET (100 MG) BY MOUTH EVERY MORNING. FOR MOOD.       folic acid (FOLVITE) 1 MG tablet Take 2 tablets (2 mg) by mouth daily (Patient not taking: Reported on 3/16/2023) 60 tablet 11     sertraline (ZOLOFT) 50 MG tablet 50 mg daily  (Patient not taking: Reported on 3/16/2023)           ALLERGIES: No known drug allergies.   SOCIAL HISTORY: She is single, living with her mother. No  smoking, no alcohol, no drug abuse.  She is in college, major in social work.  She is staying home taking care of her child.    REVIEW OF SYSTEMS: Negative.  PREVIOUS DIAGNOSTIC TESTING: She had an MRI and EEG studies over 10 years ago. Results are not available at this time.      PHYSICAL EXAMINATION:    AAO x 3, speech fluent and appropriate.     Labs:     Component      Latest Ref Rng & Units 1/6/2018   Carbamazepine Total Level      4.0 - 12.0 ug/mL 9.7   10, 11 Epoxide Level      0.4 - 4.0 ug/mL 1.4   Free Carbamazepine Level Ug/Ml      0.6 - 4.2 ug/mL 1.8   Free Epoxide Level      0.2 - 2.0 ug/mL 0.6      IMPRESSION:   1. Epilepsy.      Since the last visit about 12 months ago,  she had no seizures. Her last seizure was in Dec, 2018. Her most recently Tegretol level in Nov. 2022 was 7.6.  Most recent Na was 137.  She is currently taking Carbamazepine 300 mg - 600 mg.  No side effect reported. She is compliant with her medications.     2. Restless leg syndrome. Improved. On Ropinirole 0.5 mg at bedtime. Continue on Requip.  She is currently on Requip 05 mg qhs, and it is working very well to control her symptoms.  She also mentioned that exercise helped a lot for her RLS.  If she does not exercise, she will have a lot of problems with RLS.     PLAN:   1. Continue carbamazepine 300 mg in a.m. 600 mg p.m.    2. Continue Requip 0.5 mg qhs.  3. Folic acid 2 mg qd.  4. RTC in 12 months.    20 min total time was spent on the day of this visit.    10 min was spent on face to face time  10 min was spent on preparation of visit to review charts and labs, ordering medications and tests, and documentation of clinical information    Again, thank you for allowing me to participate in the care of your patient.      Sincerely,    Maris Otero MD

## 2023-03-16 NOTE — PROGRESS NOTES
Video-Visit Details    Type of service:  Video Visit    Video Start Time (time video started): 08:06 am    Video End Time (time video stopped): 08:16 am    Originating Location (pt. Location): Home        Distant Location (provider location):  On-site    Mode of Communication:  Video Conference via citizenmade

## 2023-03-17 NOTE — TELEPHONE ENCOUNTER
"Previous denial reviewed.  References provided by [place] [employer] plans:  link to covered alternatives, [email or web address]://[email or web address]/drug_search.[email or web address]    Review of document shows Brand \"Equetro\" is covered as a 300mg carbamazepine ER capsule  Call placed to pharmacy who ran a test claim and informed me the brand is covered at no patient cost.    Per the company website  \"INDICATIONS AND USAGE  EQUETRO is approved for the treatment of:    Acute manic or mixed episodes associated with bipolar I disorder.    Pain associated with trigeminal neuralgia. This drug is not a simple analgesic and should not be used for the relief of trivial aches or pains.    Partial seizures with complex symptomatology, generalized tonic-clonic seizures, and mixed seizures.    Limitations of Usage: EQUETRO is not indicated for the treatment of absence seizures (petit mal). Carbamazepine has been associated with increased frequency of generalized convulsions in these patient\"    \"It is the only form of carbamazepine that has no AB-rated generic equivalent and cannot be substituted with another carbamazepine formulation.\"    Prescription pended and routed to MD            "

## 2023-03-18 RX ORDER — CARBAMAZEPINE 300 MG/1
CAPSULE, EXTENDED RELEASE ORAL
Qty: 270 CAPSULE | Refills: 3 | Status: SHIPPED | OUTPATIENT
Start: 2023-03-18 | End: 2024-03-21

## 2023-06-03 ENCOUNTER — OFFICE VISIT (OUTPATIENT)
Dept: URGENT CARE | Facility: URGENT CARE | Age: 28
End: 2023-06-03
Payer: OTHER GOVERNMENT

## 2023-06-03 VITALS
BODY MASS INDEX: 41.05 KG/M2 | OXYGEN SATURATION: 98 % | DIASTOLIC BLOOD PRESSURE: 80 MMHG | TEMPERATURE: 98.5 F | WEIGHT: 270 LBS | HEART RATE: 79 BPM | SYSTOLIC BLOOD PRESSURE: 125 MMHG

## 2023-06-03 DIAGNOSIS — H40.9 ACUTE GLAUCOMA OF LEFT EYE: Primary | ICD-10-CM

## 2023-06-03 PROCEDURE — 99203 OFFICE O/P NEW LOW 30 MIN: CPT | Performed by: STUDENT IN AN ORGANIZED HEALTH CARE EDUCATION/TRAINING PROGRAM

## 2023-06-03 NOTE — PROGRESS NOTES
Assessment & Plan     Acute glaucoma of left eye  Patient with asymmetrical pupil size on exam and redness. Acute symptoms of 5 days is concerning for possible acute angle-closure glaucoma. Recommended emergent evaluation by ophthalmology to ensure that patient is acute angle-closure glaucoma. No history of debris in the eye, infectious, or inflammatory etiologies that could be contributory to patient's eye symptoms. Discussed with patient to immediately go to emergency department for further evaluation by ophthalmologist and she voices understanding and agreement. I gave patient information about acute glaucoma and the serious risks to vision if left untreated.     Chris Ramirez MD  [facility] [place]    Manuel Rivers is a 27 year old, presenting for the following health issues:  Eye Pain and Urgent Care (C/O eye pan for 5 days)         View : No data to display.              HPI     Patient reports that she has had pain, blurry vision for 5 days in her left eye. Blurry vision is getting worse, pain is the same, Constant dull pressure 3/10. No known debris in to the eyes. Never wears contacts. No discharge from the eye. Toddler did have a cold recently, but patient has not had any symptoms.     Review of Systems   Constitutional, HEENT, cardiovascular, pulmonary, gi and gu systems are negative, except as otherwise noted.      Objective    /80   Pulse 79   Temp 98.5  F (36.9  C) (Tympanic)   Wt 122.5 kg (270 lb)   LMP 05/13/2023   SpO2 98%   BMI 41.05 kg/m    Body mass index is 41.05 kg/m .  Physical Exam   GENERAL: healthy, alert and no distress  EYES: Asymmetric pupil sizes with left pupil being marginally larger than the right and also irregularly shaped with decreased sensitivity to light. Left eye is injected.  NECK: no adenopathy, no asymmetry, masses, or scars and thyroid normal to palpation  RESP: No increased work of breathing  CV: Appears well  perfused  ABDOMEN: soft, nontender, no hepatosplenomegaly, no masses and bowel sounds normal  MS: no gross musculoskeletal defects noted, no edema

## 2023-06-03 NOTE — PATIENT INSTRUCTIONS
Great to see you in UC. I recommend you go immediately to the ED to have your eyes further evaluated. I am worried about acute glaucoma or another inflammatory process and you have ongoing vision issues.

## 2023-09-10 ENCOUNTER — HEALTH MAINTENANCE LETTER (OUTPATIENT)
Age: 28
End: 2023-09-10

## 2023-11-06 NOTE — PROGRESS NOTES
"[place] [facility] ADMISSION NOTE    Patient admitted to room 2400 at approximately 0615 via wheel chair from emergency room. Patient was accompanied by transport tech and mother.     Verbal SBAR report received from Lyudmila prior to patient arrival.     Patient ambulated to bed with stand-by assist. Patient alert and oriented X 3. The patient is not having any pain.  . Admission vital signs: Blood pressure 114/62, pulse 90, temperature 99  F (37.2  C), resp. rate 18, height 1.727 m (5' 8\"), weight 112.1 kg (247 lb 2.2 oz), last menstrual period 12/07/2018, SpO2 97 %, not currently breastfeeding. Patient was oriented to plan of care, call light, bed controls, tv, telephone, bathroom and visiting hours.     Risk Assessment    The following safety risks were identified during admission: none. Yellow risk band applied: NO.     Skin Initial Assessment    This writer admitted this patient and completed a full skin assessment and Darinel score in the Adult PCS flowsheet. Appropriate interventions initiated as needed    Patient declined skin check.    Skin  Inspection of bony prominences: Full except (identify areas not inspected)  Full except areas not inspected : Buttock, right, Buttock, left, Sacrum, Coccyx    Darinel Risk Assessment  Bed Support Surface: Atmos Air mattress    Eliana Delaney    "
Patient

## 2023-12-20 RX ORDER — CARBAMAZEPINE 300 MG/1
CAPSULE, EXTENDED RELEASE ORAL
Qty: 270 CAPSULE | Refills: 7 | OUTPATIENT
Start: 2023-12-20

## 2024-03-15 DIAGNOSIS — G25.81 RESTLESS LEG SYNDROME: ICD-10-CM

## 2024-03-21 DIAGNOSIS — G25.81 RESTLESS LEG SYNDROME: ICD-10-CM

## 2024-03-21 DIAGNOSIS — G40.909 SEIZURE DISORDER (H): ICD-10-CM

## 2024-03-21 RX ORDER — CARBAMAZEPINE 300 MG/1
CAPSULE, EXTENDED RELEASE ORAL
Qty: 270 CAPSULE | Refills: 1 | Status: SHIPPED | OUTPATIENT
Start: 2024-03-21 | End: 2024-03-25

## 2024-03-21 RX ORDER — ROPINIROLE 0.5 MG/1
0.5 TABLET, FILM COATED ORAL AT BEDTIME
Qty: 90 TABLET | Refills: 1 | Status: SHIPPED | OUTPATIENT
Start: 2024-03-21 | End: 2024-09-06

## 2024-03-21 RX ORDER — ROPINIROLE 0.5 MG/1
0.5 TABLET, FILM COATED ORAL AT BEDTIME
Qty: 90 TABLET | Refills: 3 | OUTPATIENT
Start: 2024-03-21

## 2024-03-21 RX ORDER — CARBAMAZEPINE 300 MG/1
CAPSULE, EXTENDED RELEASE ORAL
Qty: 270 CAPSULE | Refills: 7 | OUTPATIENT
Start: 2024-03-21

## 2024-03-21 NOTE — TELEPHONE ENCOUNTER
CARBAMAZEPINE  MG CAP      Last Written Prescription Date:  3/16/23- discontinued 3/17/23>Patient not eligible (does not have coverage with the payer)   brand Equetro is covered. >  refill sent 3/21/24>  EQUETRO 300 MG CP12 270 capsule 1 3/21/2024     [facility] [id number] IN [employer] - [facility] [address]       Last Fill Quantity: 270,   # refills: 3  Last Office Visit : 3/16/23  Future Office visit:  6/6/24

## 2024-03-21 NOTE — TELEPHONE ENCOUNTER
Last seen: 3/16/23  RTC: 12 months  Cancel: no  No-show: no  Next appt: 6/6/24    Incoming refill from patient via phones    Medication requested: EQUETRO 300 MG CP12   Directions: Take 300 mg by mouth every morning  mg every evening. - Oral   Qty: 270 + 1 refill  Last refilled: 3/18/23    Medication refill approved per refill protocol    Medication requested: rOPINIRole (REQUIP) 0.5 MG tablet   Directions: Take 1 tablet (0.5 mg) by mouth At Bedtime   Qty: 90 + 1 refill  Last refilled: 3/16/23    Medication refill approved per refill protocol

## 2024-03-25 ENCOUNTER — TELEPHONE (OUTPATIENT)
Dept: NEUROLOGY | Facility: CLINIC | Age: 29
End: 2024-03-25
Payer: OTHER GOVERNMENT

## 2024-03-25 DIAGNOSIS — G40.909 SEIZURE DISORDER (H): Primary | ICD-10-CM

## 2024-03-25 RX ORDER — CARBAMAZEPINE 300 MG/1
CAPSULE, EXTENDED RELEASE ORAL
Qty: 270 CAPSULE | Refills: 1 | Status: SHIPPED | OUTPATIENT
Start: 2024-03-25 | End: 2024-07-19

## 2024-03-25 NOTE — TELEPHONE ENCOUNTER
What is the concern that needs to be addressed by a nurse? Pt has questions about Carbamazepine. Please give a a call back.     May a detailed message be left on voicemail? Yes    Date of last office visit: 3-16-23    Message routed to: Roberta Robledo

## 2024-03-25 NOTE — TELEPHONE ENCOUNTER
Call placed to patient  She is asking for the generic preparation as she reports brand is too expensive.  Patient has not had difficulty with the generic previously, brand Equetro was covered by her insurance last year so that was ordered.  Refill changed back to generic per protocol

## 2024-03-25 NOTE — TELEPHONE ENCOUNTER
Changed to generic per protocol  Discussed with patient that medication may appear to be different, and she should double check with the pharmacist if she has any questions

## 2024-03-28 DIAGNOSIS — G40.909 SEIZURE DISORDER (H): ICD-10-CM

## 2024-04-02 RX ORDER — FOLIC ACID 1 MG/1
2000 TABLET ORAL DAILY
Qty: 180 TABLET | Refills: 0 | Status: SHIPPED | OUTPATIENT
Start: 2024-04-02 | End: 2024-07-09

## 2024-04-02 NOTE — TELEPHONE ENCOUNTER
folic acid (FOLVITE) 1 MG tablet 180 tablet 3 3/16/2023       Last Office Visit: 3/16/23  Future Office visit:   6/6/24      90 day ken refill sent to the pharmacy. Overdue appt per the refill protocol     Anitra Perea RN  P Red Flag Triage/MRT

## 2024-06-30 DIAGNOSIS — G40.909 SEIZURE DISORDER (H): ICD-10-CM

## 2024-07-09 RX ORDER — FOLIC ACID 1 MG/1
2000 TABLET ORAL DAILY
Qty: 180 TABLET | Refills: 0 | Status: SHIPPED | OUTPATIENT
Start: 2024-07-09 | End: 2024-09-06

## 2024-07-09 NOTE — TELEPHONE ENCOUNTER
LVD:  3/16/2023  [facility]     Maris Otero MD  Neurology Seizure disorder (H) +1 more  D   NVD 9/16/24  Refilled per protocol.

## 2024-07-16 DIAGNOSIS — G40.909 SEIZURE DISORDER (H): ICD-10-CM

## 2024-07-17 NOTE — TELEPHONE ENCOUNTER
CARBAMAZEPINE  MG CAP   Take 1 capsule (300 mg) by mouth every morning AND 2 capsules (600 mg) every evening    Last Written Prescription Date:  3/25/24  Last Fill Quantity: 270,   # refills: 1  Last Office Visit : 3/16/23  Future Office visit:  9/6/24  10/24/22:   CARBAMAZEPINE  4.0 - 10.0 ug/mL 7.6   5/2/2022 CBC  Routing refill request to provider for review/approval because:  Labs past due, has upcoming appt 9/6/24i n person

## 2024-07-19 RX ORDER — CARBAMAZEPINE 300 MG/1
CAPSULE, EXTENDED RELEASE ORAL
Qty: 270 CAPSULE | Refills: 1 | Status: SHIPPED | OUTPATIENT
Start: 2024-07-19 | End: 2024-09-06

## 2024-09-06 ENCOUNTER — OFFICE VISIT (OUTPATIENT)
Dept: NEUROLOGY | Facility: CLINIC | Age: 29
End: 2024-09-06
Payer: OTHER GOVERNMENT

## 2024-09-06 VITALS
TEMPERATURE: 98 F | OXYGEN SATURATION: 98 % | HEART RATE: 74 BPM | SYSTOLIC BLOOD PRESSURE: 134 MMHG | DIASTOLIC BLOOD PRESSURE: 84 MMHG

## 2024-09-06 DIAGNOSIS — G25.81 RESTLESS LEG SYNDROME: ICD-10-CM

## 2024-09-06 DIAGNOSIS — G40.909 SEIZURE DISORDER (H): ICD-10-CM

## 2024-09-06 RX ORDER — FOLIC ACID 1 MG/1
2000 TABLET ORAL DAILY
Qty: 180 TABLET | Refills: 3 | Status: SHIPPED | OUTPATIENT
Start: 2024-09-06

## 2024-09-06 RX ORDER — ROPINIROLE 0.5 MG/1
0.5 TABLET, FILM COATED ORAL AT BEDTIME
Qty: 90 TABLET | Refills: 1 | Status: SHIPPED | OUTPATIENT
Start: 2024-09-06

## 2024-09-06 RX ORDER — CARBAMAZEPINE 300 MG/1
CAPSULE, EXTENDED RELEASE ORAL
Qty: 270 CAPSULE | Refills: 3 | Status: SHIPPED | OUTPATIENT
Start: 2024-09-06

## 2024-09-06 ASSESSMENT — PAIN SCALES - GENERAL: PAINLEVEL: NO PAIN (0)

## 2024-09-06 NOTE — PROGRESS NOTES
"CC: Follow up for seizures.     HPI: In person follow up.  She is a 29-year-old, right-handed female with a history of seizures since 2000.      Since the last visit about 18 months ago,  she had one seizure in May 2023, when she was sleep deprived with her new baby. She had focal aware seizure with right arm and leg jerking for a few minutes each with 3 spells.  Her last seizure before this was in Dec, 2018. She daughter is 4 years old, her son is almost 2, both are healthy. Her most recently Tegretol level in Nov. 2022 was 7.6.  Most recent Na was 139.  She is currently taking Carbamazepine 300 mg - 600 mg.  No side effect reported.  She is compliant with her medications.      She is taking Ropinirole 0.5 mg at bedtime for restless leg syndrome. No complains. No side effects.    She tried Keppra 750 mg bid in the past.  She could not tolerate the keppra.   She was also diagnosed with \"restless leg syndrome\".  She is currently on Requip 05 mg qhs, and it is working very well to control her symptoms.  She also mentioned that exercise helped a lot for her RLS.  If she does not exercise, she will have a lot of problems with RLS.     Her seizures started in 2000. Her typical seizures are described as she has an aura of right arm and leg tingling achy sensation, then followed by right arm and leg shaking arrhythmically which lasts for about 30 seconds to 1 minute. She was started on carbamazepine many years ago, and her seizures were well controlled until 05/2009 when she was trying to wean herself off the carbamazepine, then she started seizure recurrence. She had a series of a generalized tonic-clonic seizures at that time, so she was put back on carbamazepine. Her seizures usually occur in the early morning. In 12/2011, when she was seeing Dr. Villa, he was trying to wean the patient off the carbamazepine again, and in February, after she was off the medication for a week, then she had another simple partial " seizure with the right arm and the leg jerking for about 1 minute,   RISK FACTORS FOR SEIZURES: She had no history of head trauma with loss of consciousness. No history of CNS infections. No history of febrile convulsions.    TRIGGERS FOR SEIZURES Unknown.   PAST MEDICAL HISTORY: None.   PAST SURGICAL HISTORY: None.   FAMILY HISTORY: No family history of seizures. Grandmother had a history of diabetes. Aunt had a history of brain hemorrhage. Mother and sister have RLS.       Current Outpatient Medications   Medication Sig Dispense Refill    carBAMazepine (CARBATROL) 300 MG 12 hr capsule TAKE 1 CAPSULE (300 MG) BY MOUTH EVERY MORNING AND 2 CAPSULES (600 MG) EVERY EVENING. 270 capsule 1    folic acid (FOLVITE) 1 MG tablet TAKE 2 TABLETS (2,000 MCG) BY MOUTH DAILY 180 tablet 0    Prenatal Vit-Fe Fumarate-FA (PRENATAL MULTIVITAMIN W/IRON) 27-0.8 MG tablet Take 1 tablet by mouth every evening      rOPINIRole (REQUIP) 0.5 MG tablet Take 1 tablet (0.5 mg) by mouth at bedtime Call clinic to schedule follow up appointment. 90 tablet 1    sertraline (ZOLOFT) 100 MG tablet TAKE 1 TABLET (100 MG) BY MOUTH EVERY MORNING. FOR MOOD.           ALLERGIES: No known drug allergies.   SOCIAL HISTORY: She is single, living with her mother. No smoking, no alcohol, no drug abuse.  She is in college, major in social work.  She is staying home taking care of her child.    REVIEW OF SYSTEMS: Negative.  PREVIOUS DIAGNOSTIC TESTING: She had an MRI and EEG studies over 10 years ago. Results are not available at this time.      PHYSICAL EXAMINATION:  Blood pressure 134/84, pulse 74, temperature 98  F (36.7  C), temperature source Temporal, SpO2 98%, not currently breastfeeding.    General exam: General Appearance: No acute distress. HEENT: Normocephalic, atraumatic. Neck: Supple.  Extremities: No edema.     Neurologic Exam: Alert and oriented x3. Speech fluent, appropriate. Normal attention. Cranial Nerves: Pupils are equal, round, reactive to  light and accomodation. Extraocular movement intact. No facial weakness or asymmetry. Hearing normal. Motor Exam: Normal. Coordination:no ataxia.  Gait and Station: normal.     Labs:     Component      Latest Ref Rng & Units 1/6/2018   Carbamazepine Total Level      4.0 - 12.0 ug/mL 9.7   10, 11 Epoxide Level      0.4 - 4.0 ug/mL 1.4   Free Carbamazepine Level Ug/Ml      0.6 - 4.2 ug/mL 1.8   Free Epoxide Level      0.2 - 2.0 ug/mL 0.6      IMPRESSION:   1. Epilepsy.      Since the last visit about 18 months ago,  she had one seizure in May 2023, when she was sleep deprived with her new baby. She had focal aware seizure with right arm and leg jerking for a few minutes each with 3 spells.  Her last seizure before this was in Dec, 2018. She daughter is 4 years old, her son is almost 2, both are healthy. Her most recently Tegretol level in Nov. 2022 was 7.6.  Most recent Na was 139.  She is currently taking Carbamazepine 300 mg - 600 mg.  No side effect reported.  She is compliant with her medications.     2. Restless leg syndrome. Continue on Requip.  She is currently on Requip 05 mg qhs, and it is working very well to control her symptoms.  She also mentioned that exercise helped a lot for her RLS.  If she does not exercise, she will have a lot of problems with RLS.     PLAN:   1. Continue carbamazepine 300 mg in a.m. 600 mg p.m.    2. Continue Requip 0.5 mg qhs.  3. Folic acid 2 mg qd.  4. RTC in 12 months.      The longitudinal plan of care for seizures was addressed during this visit. Due to the added complexity in care, I will continue to support this patient in the subsequent management of this condition and with the ongoing continuity of care of this condition.       23 min total time was spent on the day of this visit.      15 min was spent on face to face time  8 min was spent on preparation of visit to review charts and labs, ordering medications and tests, and documentation of clinical information

## 2024-09-06 NOTE — LETTER
"2024       RE: Tita Prieto  : 1995   MRN: [id number]      Dear Colleague,    Thank you for referring your patient, Tita Prieto, to the [facility] at [facility]. Please see a copy of my visit note below.    CC: Follow up for seizures.     HPI: In person follow up.  She is a 29-year-old, right-handed female with a history of seizures since .      Since the last visit about 18 months ago,  she had one seizure in May 2023, when she was sleep deprived with her new baby. She had focal aware seizure with right arm and leg jerking for a few minutes each with 3 spells.  Her last seizure before this was in Dec, 2018. She daughter is 4 years old, her son is almost 2, both are healthy. Her most recently Tegretol level in 2022 was 7.6.  Most recent Na was 139.  She is currently taking Carbamazepine 300 mg - 600 mg.  No side effect reported.  She is compliant with her medications.      She is taking Ropinirole 0.5 mg at bedtime for restless leg syndrome. No complains. No side effects.    She tried Keppra 750 mg bid in the past.  She could not tolerate the keppra.   She was also diagnosed with \"restless leg syndrome\".  She is currently on Requip 05 mg qhs, and it is working very well to control her symptoms.  She also mentioned that exercise helped a lot for her RLS.  If she does not exercise, she will have a lot of problems with RLS.     Her seizures started in . Her typical seizures are described as she has an aura of right arm and leg tingling achy sensation, then followed by right arm and leg shaking arrhythmically which lasts for about 30 seconds to 1 minute. She was started on carbamazepine many years ago, and her seizures were well controlled until 2009 when she was trying to wean herself off the carbamazepine, then she started seizure recurrence. She had a series of a generalized tonic-clonic seizures at that time, so " she was put back on carbamazepine. Her seizures usually occur in the early morning. In 12/2011, when she was seeing Dr. Villa, he was trying to wean the patient off the carbamazepine again, and in February, after she was off the medication for a week, then she had another simple partial seizure with the right arm and the leg jerking for about 1 minute,   RISK FACTORS FOR SEIZURES: She had no history of head trauma with loss of consciousness. No history of CNS infections. No history of febrile convulsions.    TRIGGERS FOR SEIZURES Unknown.   PAST MEDICAL HISTORY: None.   PAST SURGICAL HISTORY: None.   FAMILY HISTORY: No family history of seizures. Grandmother had a history of diabetes. Aunt had a history of brain hemorrhage. Mother and sister have RLS.       Current Outpatient Medications   Medication Sig Dispense Refill     carBAMazepine (CARBATROL) 300 MG 12 hr capsule TAKE 1 CAPSULE (300 MG) BY MOUTH EVERY MORNING AND 2 CAPSULES (600 MG) EVERY EVENING. 270 capsule 1     folic acid (FOLVITE) 1 MG tablet TAKE 2 TABLETS (2,000 MCG) BY MOUTH DAILY 180 tablet 0     Prenatal Vit-Fe Fumarate-FA (PRENATAL MULTIVITAMIN W/IRON) 27-0.8 MG tablet Take 1 tablet by mouth every evening       rOPINIRole (REQUIP) 0.5 MG tablet Take 1 tablet (0.5 mg) by mouth at bedtime Call clinic to schedule follow up appointment. 90 tablet 1     sertraline (ZOLOFT) 100 MG tablet TAKE 1 TABLET (100 MG) BY MOUTH EVERY MORNING. FOR MOOD.           ALLERGIES: No known drug allergies.   SOCIAL HISTORY: She is single, living with her mother. No smoking, no alcohol, no drug abuse.  She is in college, major in social work.  She is staying home taking care of her child.    REVIEW OF SYSTEMS: Negative.  PREVIOUS DIAGNOSTIC TESTING: She had an MRI and EEG studies over 10 years ago. Results are not available at this time.      PHYSICAL EXAMINATION:  Blood pressure 134/84, pulse 74, temperature 98  F (36.7  C), temperature source Temporal, SpO2 98%, not  currently breastfeeding.    General exam: General Appearance: No acute distress. HEENT: Normocephalic, atraumatic. Neck: Supple.  Extremities: No edema.     Neurologic Exam: Alert and oriented x3. Speech fluent, appropriate. Normal attention. Cranial Nerves: Pupils are equal, round, reactive to light and accomodation. Extraocular movement intact. No facial weakness or asymmetry. Hearing normal. Motor Exam: Normal. Coordination:no ataxia.  Gait and Station: normal.     Labs:     Component      Latest Ref Rng & Units 1/6/2018   Carbamazepine Total Level      4.0 - 12.0 ug/mL 9.7   10, 11 Epoxide Level      0.4 - 4.0 ug/mL 1.4   Free Carbamazepine Level Ug/Ml      0.6 - 4.2 ug/mL 1.8   Free Epoxide Level      0.2 - 2.0 ug/mL 0.6      IMPRESSION:   1. Epilepsy.      Since the last visit about 18 months ago,  she had one seizure in May 2023, when she was sleep deprived with her new baby. She had focal aware seizure with right arm and leg jerking for a few minutes each with 3 spells.  Her last seizure before this was in Dec, 2018. She daughter is 4 years old, her son is almost 2, both are healthy. Her most recently Tegretol level in Nov. 2022 was 7.6.  Most recent Na was 139.  She is currently taking Carbamazepine 300 mg - 600 mg.  No side effect reported.  She is compliant with her medications.     2. Restless leg syndrome. Continue on Requip.  She is currently on Requip 05 mg qhs, and it is working very well to control her symptoms.  She also mentioned that exercise helped a lot for her RLS.  If she does not exercise, she will have a lot of problems with RLS.     PLAN:   1. Continue carbamazepine 300 mg in a.m. 600 mg p.m.    2. Continue Requip 0.5 mg qhs.  3. Folic acid 2 mg qd.  4. RTC in 12 months.      The longitudinal plan of care for seizures was addressed during this visit. Due to the added complexity in care, I will continue to support this patient in the subsequent management of this condition and with the  ongoing continuity of care of this condition.       23 min total time was spent on the day of this visit.      15 min was spent on face to face time  8 min was spent on preparation of visit to review charts and labs, ordering medications and tests, and documentation of clinical information      Again, thank you for allowing me to participate in the care of your patient.      Sincerely,    Maris Otero MD

## 2024-11-03 ENCOUNTER — HEALTH MAINTENANCE LETTER (OUTPATIENT)
Age: 29
End: 2024-11-03

## 2025-03-06 DIAGNOSIS — G25.81 RESTLESS LEG SYNDROME: Primary | ICD-10-CM

## 2025-03-11 RX ORDER — ROPINIROLE 0.5 MG/1
0.5 TABLET, FILM COATED ORAL AT BEDTIME
Qty: 90 TABLET | Refills: 1 | Status: SHIPPED | OUTPATIENT
Start: 2025-03-11